# Patient Record
Sex: MALE | Race: WHITE | Employment: OTHER | URBAN - METROPOLITAN AREA
[De-identification: names, ages, dates, MRNs, and addresses within clinical notes are randomized per-mention and may not be internally consistent; named-entity substitution may affect disease eponyms.]

---

## 2018-02-02 ENCOUNTER — ANESTHESIA EVENT (OUTPATIENT)
Dept: OPERATING ROOM | Age: 68
DRG: 394 | End: 2018-02-02
Payer: MEDICARE

## 2018-02-02 ENCOUNTER — ANESTHESIA (OUTPATIENT)
Dept: OPERATING ROOM | Age: 68
DRG: 394 | End: 2018-02-02
Payer: MEDICARE

## 2018-02-02 ENCOUNTER — APPOINTMENT (OUTPATIENT)
Dept: NUCLEAR MEDICINE | Age: 68
DRG: 394 | End: 2018-02-02
Payer: MEDICARE

## 2018-02-02 ENCOUNTER — HOSPITAL ENCOUNTER (INPATIENT)
Age: 68
LOS: 1 days | Discharge: HOME | DRG: 394 | End: 2018-02-03
Admitting: INTERNAL MEDICINE
Payer: MEDICARE

## 2018-02-02 VITALS — DIASTOLIC BLOOD PRESSURE: 52 MMHG | SYSTOLIC BLOOD PRESSURE: 92 MMHG | OXYGEN SATURATION: 98 %

## 2018-02-02 DIAGNOSIS — R55 SYNCOPE AND COLLAPSE: ICD-10-CM

## 2018-02-02 DIAGNOSIS — K92.2 LOWER GI BLEED: Primary | ICD-10-CM

## 2018-02-02 PROBLEM — K62.5 GASTROINTESTINAL HEMORRHAGE ASSOCIATED WITH ANORECTAL SOURCE: Status: ACTIVE | Noted: 2018-02-02

## 2018-02-02 LAB
ABSOLUTE EOS #: 0.1 K/UL (ref 0–0.4)
ABSOLUTE IMMATURE GRANULOCYTE: ABNORMAL K/UL (ref 0–0.3)
ABSOLUTE LYMPH #: 1.4 K/UL (ref 1–4.8)
ABSOLUTE MONO #: 0.5 K/UL (ref 0.2–0.8)
ALBUMIN SERPL-MCNC: 4 G/DL (ref 3.5–5.2)
ALBUMIN/GLOBULIN RATIO: ABNORMAL (ref 1–2.5)
ALP BLD-CCNC: 54 U/L (ref 40–129)
ALT SERPL-CCNC: 21 U/L (ref 5–41)
ANION GAP SERPL CALCULATED.3IONS-SCNC: 11 MMOL/L (ref 9–17)
AST SERPL-CCNC: 18 U/L
BASOPHILS # BLD: 1 % (ref 0–2)
BASOPHILS ABSOLUTE: 0 K/UL (ref 0–0.2)
BILIRUB SERPL-MCNC: 0.22 MG/DL (ref 0.3–1.2)
BILIRUBIN DIRECT: <0.08 MG/DL
BILIRUBIN URINE: NEGATIVE
BILIRUBIN, INDIRECT: ABNORMAL MG/DL (ref 0–1)
BUN BLDV-MCNC: 20 MG/DL (ref 8–23)
BUN/CREAT BLD: 22 (ref 9–20)
CALCIUM SERPL-MCNC: 8.1 MG/DL (ref 8.6–10.4)
CHLORIDE BLD-SCNC: 104 MMOL/L (ref 98–107)
CK MB: 1.4 NG/ML
CO2: 20 MMOL/L (ref 20–31)
COLOR: YELLOW
COMMENT UA: ABNORMAL
CREAT SERPL-MCNC: 0.9 MG/DL (ref 0.7–1.2)
DIFFERENTIAL TYPE: ABNORMAL
EKG ATRIAL RATE: 63 BPM
EKG P AXIS: 35 DEGREES
EKG P-R INTERVAL: 218 MS
EKG Q-T INTERVAL: 438 MS
EKG QRS DURATION: 92 MS
EKG QTC CALCULATION (BAZETT): 448 MS
EKG R AXIS: -11 DEGREES
EKG T AXIS: 4 DEGREES
EKG VENTRICULAR RATE: 63 BPM
EOSINOPHILS RELATIVE PERCENT: 2 % (ref 1–4)
GFR AFRICAN AMERICAN: >60 ML/MIN
GFR NON-AFRICAN AMERICAN: >60 ML/MIN
GFR SERPL CREATININE-BSD FRML MDRD: ABNORMAL ML/MIN/{1.73_M2}
GFR SERPL CREATININE-BSD FRML MDRD: ABNORMAL ML/MIN/{1.73_M2}
GLOBULIN: ABNORMAL G/DL (ref 1.5–3.8)
GLUCOSE BLD-MCNC: 153 MG/DL (ref 70–99)
GLUCOSE URINE: NEGATIVE
HCT VFR BLD CALC: 19.7 % (ref 41–53)
HCT VFR BLD CALC: 20.7 % (ref 41–53)
HCT VFR BLD CALC: 23 % (ref 41–53)
HEMOGLOBIN: 6.3 G/DL (ref 13.5–17.5)
HEMOGLOBIN: 6.6 G/DL (ref 13.5–17.5)
HEMOGLOBIN: 7.3 G/DL (ref 13.5–17.5)
HEMOGLOBIN: 8 G/DL (ref 13.5–17.5)
IMMATURE GRANULOCYTES: ABNORMAL %
INR BLD: 1
KETONES, URINE: ABNORMAL
LACTIC ACID: 1.1 MMOL/L (ref 0.5–2.2)
LACTIC ACID: 2 MMOL/L (ref 0.5–2.2)
LEUKOCYTE ESTERASE, URINE: NEGATIVE
LIPASE: 38 U/L (ref 13–60)
LYMPHOCYTES # BLD: 20 % (ref 24–44)
MAGNESIUM: 2.1 MG/DL (ref 1.6–2.6)
MCH RBC QN AUTO: 25 PG (ref 26–34)
MCHC RBC AUTO-ENTMCNC: 31.5 G/DL (ref 31–37)
MCV RBC AUTO: 79.5 FL (ref 80–100)
MONOCYTES # BLD: 8 % (ref 1–7)
MYOGLOBIN: 24 NG/ML (ref 28–72)
MYOGLOBIN: 24 NG/ML (ref 28–72)
MYOGLOBIN: 49 NG/ML (ref 28–72)
NITRITE, URINE: NEGATIVE
NRBC AUTOMATED: ABNORMAL PER 100 WBC
PARTIAL THROMBOPLASTIN TIME: 19.4 SEC (ref 23–31)
PDW BLD-RTO: 19.1 % (ref 11.5–14.5)
PH UA: 5.5 (ref 5–8)
PLATELET # BLD: 287 K/UL (ref 130–400)
PLATELET ESTIMATE: ABNORMAL
PMV BLD AUTO: 9.1 FL (ref 6–12)
POTASSIUM SERPL-SCNC: 4.2 MMOL/L (ref 3.7–5.3)
PROTEIN UA: NEGATIVE
PROTHROMBIN TIME: 10.7 SEC (ref 9.7–11.6)
RBC # BLD: 2.9 M/UL (ref 4.5–5.9)
RBC # BLD: ABNORMAL 10*6/UL
SEG NEUTROPHILS: 69 % (ref 36–66)
SEGMENTED NEUTROPHILS ABSOLUTE COUNT: 4.9 K/UL (ref 1.8–7.7)
SODIUM BLD-SCNC: 135 MMOL/L (ref 135–144)
SPECIFIC GRAVITY UA: 1.02 (ref 1–1.03)
TOTAL CK: 59 U/L (ref 39–308)
TOTAL PROTEIN: 6.3 G/DL (ref 6.4–8.3)
TROPONIN INTERP: ABNORMAL
TROPONIN INTERP: ABNORMAL
TROPONIN INTERP: NORMAL
TROPONIN T: <0.03 NG/ML
TURBIDITY: CLEAR
URINE HGB: NEGATIVE
UROBILINOGEN, URINE: NORMAL
WBC # BLD: 7 K/UL (ref 3.5–11)
WBC # BLD: ABNORMAL 10*3/UL

## 2018-02-02 PROCEDURE — 3700000000 HC ANESTHESIA ATTENDED CARE: Performed by: INTERNAL MEDICINE

## 2018-02-02 PROCEDURE — 84484 ASSAY OF TROPONIN QUANT: CPT

## 2018-02-02 PROCEDURE — 3609027000 HC COLONOSCOPY: Performed by: INTERNAL MEDICINE

## 2018-02-02 PROCEDURE — 86850 RBC ANTIBODY SCREEN: CPT

## 2018-02-02 PROCEDURE — 2580000003 HC RX 258: Performed by: ANESTHESIOLOGY

## 2018-02-02 PROCEDURE — 36430 TRANSFUSION BLD/BLD COMPNT: CPT | Performed by: NURSE PRACTITIONER

## 2018-02-02 PROCEDURE — 87205 SMEAR GRAM STAIN: CPT

## 2018-02-02 PROCEDURE — 3430000000 HC RX DIAGNOSTIC RADIOPHARMACEUTICAL: Performed by: INTERNAL MEDICINE

## 2018-02-02 PROCEDURE — 2500000003 HC RX 250 WO HCPCS: Performed by: ANESTHESIOLOGY

## 2018-02-02 PROCEDURE — 99285 EMERGENCY DEPT VISIT HI MDM: CPT

## 2018-02-02 PROCEDURE — 85014 HEMATOCRIT: CPT

## 2018-02-02 PROCEDURE — 83735 ASSAY OF MAGNESIUM: CPT

## 2018-02-02 PROCEDURE — 36415 COLL VENOUS BLD VENIPUNCTURE: CPT

## 2018-02-02 PROCEDURE — 93005 ELECTROCARDIOGRAM TRACING: CPT

## 2018-02-02 PROCEDURE — 82553 CREATINE MB FRACTION: CPT

## 2018-02-02 PROCEDURE — 0W3P8ZZ CONTROL BLEEDING IN GASTROINTESTINAL TRACT, VIA NATURAL OR ARTIFICIAL OPENING ENDOSCOPIC: ICD-10-PCS | Performed by: INTERNAL MEDICINE

## 2018-02-02 PROCEDURE — 45382 COLONOSCOPY W/CONTROL BLEED: CPT | Performed by: INTERNAL MEDICINE

## 2018-02-02 PROCEDURE — P9016 RBC LEUKOCYTES REDUCED: HCPCS

## 2018-02-02 PROCEDURE — 78278 ACUTE GI BLOOD LOSS IMAGING: CPT

## 2018-02-02 PROCEDURE — A9560 TC99M LABELED RBC: HCPCS | Performed by: INTERNAL MEDICINE

## 2018-02-02 PROCEDURE — 83605 ASSAY OF LACTIC ACID: CPT

## 2018-02-02 PROCEDURE — 2720000010 HC SURG SUPPLY STERILE: Performed by: INTERNAL MEDICINE

## 2018-02-02 PROCEDURE — 83874 ASSAY OF MYOGLOBIN: CPT

## 2018-02-02 PROCEDURE — 2780000010 HC IMPLANT OTHER: Performed by: INTERNAL MEDICINE

## 2018-02-02 PROCEDURE — 6360000002 HC RX W HCPCS: Performed by: ANESTHESIOLOGY

## 2018-02-02 PROCEDURE — 86920 COMPATIBILITY TEST SPIN: CPT

## 2018-02-02 PROCEDURE — 6360000002 HC RX W HCPCS: Performed by: INTERNAL MEDICINE

## 2018-02-02 PROCEDURE — 85018 HEMOGLOBIN: CPT

## 2018-02-02 PROCEDURE — 85730 THROMBOPLASTIN TIME PARTIAL: CPT

## 2018-02-02 PROCEDURE — 87505 NFCT AGENT DETECTION GI: CPT

## 2018-02-02 PROCEDURE — 85025 COMPLETE CBC W/AUTO DIFF WBC: CPT

## 2018-02-02 PROCEDURE — 80076 HEPATIC FUNCTION PANEL: CPT

## 2018-02-02 PROCEDURE — 82272 OCCULT BLD FECES 1-3 TESTS: CPT

## 2018-02-02 PROCEDURE — 80048 BASIC METABOLIC PNL TOTAL CA: CPT

## 2018-02-02 PROCEDURE — 3700000001 HC ADD 15 MINUTES (ANESTHESIA): Performed by: INTERNAL MEDICINE

## 2018-02-02 PROCEDURE — 83690 ASSAY OF LIPASE: CPT

## 2018-02-02 PROCEDURE — 81003 URINALYSIS AUTO W/O SCOPE: CPT

## 2018-02-02 PROCEDURE — 2580000003 HC RX 258: Performed by: INTERNAL MEDICINE

## 2018-02-02 PROCEDURE — 2000000000 HC ICU R&B

## 2018-02-02 PROCEDURE — 82550 ASSAY OF CK (CPK): CPT

## 2018-02-02 PROCEDURE — 36430 TRANSFUSION BLD/BLD COMPNT: CPT

## 2018-02-02 PROCEDURE — 86900 BLOOD TYPING SEROLOGIC ABO: CPT

## 2018-02-02 PROCEDURE — C9113 INJ PANTOPRAZOLE SODIUM, VIA: HCPCS | Performed by: INTERNAL MEDICINE

## 2018-02-02 PROCEDURE — 7100000001 HC PACU RECOVERY - ADDTL 15 MIN: Performed by: INTERNAL MEDICINE

## 2018-02-02 PROCEDURE — 7100000000 HC PACU RECOVERY - FIRST 15 MIN: Performed by: INTERNAL MEDICINE

## 2018-02-02 PROCEDURE — 99223 1ST HOSP IP/OBS HIGH 75: CPT | Performed by: INTERNAL MEDICINE

## 2018-02-02 PROCEDURE — 2580000003 HC RX 258

## 2018-02-02 PROCEDURE — 86901 BLOOD TYPING SEROLOGIC RH(D): CPT

## 2018-02-02 PROCEDURE — 85610 PROTHROMBIN TIME: CPT

## 2018-02-02 DEVICE — WORKING LENGTH 235CM, WORKING CHANNEL 2.8MM
Type: IMPLANTABLE DEVICE | Status: FUNCTIONAL
Brand: RESOLUTION 360 CLIP

## 2018-02-02 RX ORDER — LIDOCAINE HYDROCHLORIDE 20 MG/ML
INJECTION, SOLUTION INFILTRATION; PERINEURAL PRN
Status: DISCONTINUED | OUTPATIENT
Start: 2018-02-02 | End: 2018-02-02 | Stop reason: SDUPTHER

## 2018-02-02 RX ORDER — 0.9 % SODIUM CHLORIDE 0.9 %
1000 INTRAVENOUS SOLUTION INTRAVENOUS ONCE
Status: COMPLETED | OUTPATIENT
Start: 2018-02-02 | End: 2018-02-02

## 2018-02-02 RX ORDER — EPINEPHRINE 1 MG/ML
INJECTION, SOLUTION, CONCENTRATE INTRAVENOUS PRN
Status: DISCONTINUED | OUTPATIENT
Start: 2018-02-02 | End: 2018-02-02 | Stop reason: HOSPADM

## 2018-02-02 RX ORDER — SODIUM CHLORIDE 9 MG/ML
INJECTION, SOLUTION INTRAVENOUS CONTINUOUS PRN
Status: DISCONTINUED | OUTPATIENT
Start: 2018-02-02 | End: 2018-02-02 | Stop reason: SDUPTHER

## 2018-02-02 RX ORDER — ACETAMINOPHEN 325 MG/1
650 TABLET ORAL EVERY 4 HOURS PRN
Status: DISCONTINUED | OUTPATIENT
Start: 2018-02-02 | End: 2018-02-03 | Stop reason: HOSPADM

## 2018-02-02 RX ORDER — PROPOFOL 10 MG/ML
INJECTION, EMULSION INTRAVENOUS PRN
Status: DISCONTINUED | OUTPATIENT
Start: 2018-02-02 | End: 2018-02-02 | Stop reason: SDUPTHER

## 2018-02-02 RX ORDER — SODIUM CHLORIDE 0.9 % (FLUSH) 0.9 %
10 SYRINGE (ML) INJECTION PRN
Status: DISCONTINUED | OUTPATIENT
Start: 2018-02-02 | End: 2018-02-03 | Stop reason: HOSPADM

## 2018-02-02 RX ORDER — ONDANSETRON 2 MG/ML
4 INJECTION INTRAMUSCULAR; INTRAVENOUS
Status: DISCONTINUED | OUTPATIENT
Start: 2018-02-02 | End: 2018-02-02 | Stop reason: HOSPADM

## 2018-02-02 RX ORDER — 0.9 % SODIUM CHLORIDE 0.9 %
500 INTRAVENOUS SOLUTION INTRAVENOUS ONCE
Status: COMPLETED | OUTPATIENT
Start: 2018-02-02 | End: 2018-02-02

## 2018-02-02 RX ORDER — ONDANSETRON 2 MG/ML
4 INJECTION INTRAMUSCULAR; INTRAVENOUS EVERY 6 HOURS PRN
Status: DISCONTINUED | OUTPATIENT
Start: 2018-02-02 | End: 2018-02-03 | Stop reason: HOSPADM

## 2018-02-02 RX ORDER — SODIUM CHLORIDE 0.9 % (FLUSH) 0.9 %
10 SYRINGE (ML) INJECTION EVERY 12 HOURS SCHEDULED
Status: DISCONTINUED | OUTPATIENT
Start: 2018-02-02 | End: 2018-02-03 | Stop reason: HOSPADM

## 2018-02-02 RX ADMIN — SODIUM CHLORIDE 500 ML: 9 INJECTION, SOLUTION INTRAVENOUS at 15:15

## 2018-02-02 RX ADMIN — SODIUM CHLORIDE 8 MG/HR: 9 INJECTION, SOLUTION INTRAVENOUS at 11:03

## 2018-02-02 RX ADMIN — SODIUM CHLORIDE 80 MG: 9 INJECTION, SOLUTION INTRAVENOUS at 10:45

## 2018-02-02 RX ADMIN — PROPOFOL 100 MG: 10 INJECTION, EMULSION INTRAVENOUS at 16:48

## 2018-02-02 RX ADMIN — SODIUM CHLORIDE: 9 INJECTION, SOLUTION INTRAVENOUS at 16:43

## 2018-02-02 RX ADMIN — SODIUM CHLORIDE 8 MG/HR: 9 INJECTION, SOLUTION INTRAVENOUS at 22:00

## 2018-02-02 RX ADMIN — PROPOFOL 50 MG: 10 INJECTION, EMULSION INTRAVENOUS at 17:01

## 2018-02-02 RX ADMIN — Medication 26 MILLICURIE: at 07:50

## 2018-02-02 RX ADMIN — SODIUM CHLORIDE 1000 ML: 9 INJECTION, SOLUTION INTRAVENOUS at 05:30

## 2018-02-02 RX ADMIN — SODIUM CHLORIDE: 9 INJECTION, SOLUTION INTRAVENOUS at 07:38

## 2018-02-02 RX ADMIN — PROPOFOL 50 MG: 10 INJECTION, EMULSION INTRAVENOUS at 16:50

## 2018-02-02 RX ADMIN — PROPOFOL 20 MG: 10 INJECTION, EMULSION INTRAVENOUS at 17:03

## 2018-02-02 RX ADMIN — Medication 0.2 MG: at 17:08

## 2018-02-02 RX ADMIN — LIDOCAINE HYDROCHLORIDE 100 MG: 20 INJECTION, SOLUTION INFILTRATION; PERINEURAL at 16:48

## 2018-02-02 ASSESSMENT — ENCOUNTER SYMPTOMS
COUGH: 0
NAUSEA: 1
VOMITING: 0
SHORTNESS OF BREATH: 0
PHOTOPHOBIA: 1
SHORTNESS OF BREATH: 0
ABDOMINAL DISTENTION: 1
BLOOD IN STOOL: 1
DIARRHEA: 1
CHEST TIGHTNESS: 1

## 2018-02-02 ASSESSMENT — PAIN DESCRIPTION - LOCATION
LOCATION: RECTUM
LOCATION: ABDOMEN;RECTUM
LOCATION: RECTUM

## 2018-02-02 ASSESSMENT — PULMONARY FUNCTION TESTS
PIF_VALUE: 1

## 2018-02-02 ASSESSMENT — PAIN SCALES - GENERAL: PAINLEVEL_OUTOF10: 0

## 2018-02-02 ASSESSMENT — PAIN DESCRIPTION - PAIN TYPE
TYPE: SURGICAL PAIN

## 2018-02-02 NOTE — ED PROVIDER NOTES
cervical adenopathy. Neurological: He is alert and oriented to person, place, and time. He displays normal reflexes. No cranial nerve deficit. Coordination normal.   Skin: Skin is warm and dry. Psychiatric: He has a normal mood and affect. His behavior is normal.   Nursing note and vitals reviewed. DIAGNOSTIC RESULTS     EKG: All EKG's are interpreted by the Emergency Department Physician who either signs or Co-signs this chart in the absence of a cardiologist.    Normal sinus rhythm with first-degree AV block    RADIOLOGY:   Non-plain film images such as CT, Ultrasound and MRI are read by the radiologist. Plain radiographic images are visualized and preliminarily interpreted by the emergency physician with the below findings:      No results found. Interpretation per the Radiologist below, if available at the time of this note:    No orders to display         ED BEDSIDE ULTRASOUND:   Performed by ED Physician - none    LABS:  Labs Reviewed   HEPATIC FUNCTION PANEL - Abnormal; Notable for the following:        Result Value    Total Bilirubin 0.22 (*)     Total Protein 6.3 (*)     All other components within normal limits   CBC WITH AUTO DIFFERENTIAL - Abnormal; Notable for the following:     RBC 2.90 (*)     Hemoglobin 7.3 (*)     Hematocrit 23.0 (*)     MCV 79.5 (*)     MCH 25.0 (*)     RDW 19.1 (*)     Seg Neutrophils 69 (*)     Lymphocytes 20 (*)     Monocytes 8 (*)     All other components within normal limits   C DIFF TOXIN B BY RT PCR   CULTURE STOOL   FECAL LEUKOCYTES   LIPASE   MAGNESIUM   TROP/MYOGLOBIN   CK-MB   CK   APTT   LACTIC ACID   PROTIME-INR   URINALYSIS   BLOOD OCCULT STOOL DIAGNOSTIC   TYPE AND SCREEN   TYPE AND SCREEN   PREPARE RBC (CROSSMATCH)       All other labs were within normal range or not returned as of this dictation.     EMERGENCY DEPARTMENT COURSE and DIFFERENTIAL DIAGNOSIS/MDM:   Vitals:    Vitals:    02/02/18 0501 02/02/18 0516 02/02/18 0531 02/02/18 0546   BP: 113/80 122/77 107/75 113/81   Pulse: 60 64 62 62   Resp: 8 10 12 12   Temp:       SpO2:   100% 99%   Weight:       Height:         Patient is typed and crossed for 2 units of blood will be admitted to the intensive care unit with consult to GI. The bleeding is felt to be lower GI tract. There is no history of antibiotic use or colitis. Critical care time on this patient's 40 minutes    CONSULTS:  IP CONSULT TO INTERNAL MEDICINE  IP CONSULT TO GI    PROCEDURES:  Not clinically indicated. FINAL IMPRESSION      1. Lower GI bleed    2. Syncope and collapse          DISPOSITION/PLAN   DISPOSITION Decision To Admit 02/02/2018 05:48:32 AM      PATIENT REFERRED TO:   No follow-up provider specified.     DISCHARGE MEDICATIONS:     New Prescriptions    No medications on file           (Please note that portions of this note were completed with a voice recognition program.  Efforts were made to edit the dictations but occasionally words are mis-transcribed.)    Adolph Hdz MD  Attending Emergency Physician           Adolph Hdz MD  02/02/18 [id number]

## 2018-02-02 NOTE — OP NOTE
carefully inspected. The mucosal exam showed moderate diverticulosis in left colon with no active bleeding. An ulcerated internal hemorrhoid with active bleeding was noted. Retroflexion was performed in the rectum and internal hemorrhoids. Epinephrine was injected into the bleeding site with some control. Further we placed a hemoclip at the base of the bleeding site. Subsequently a band was placed on the bleeding hemorrhoid with adequate control. RECOMMENDATIONS:   1) Transfer back to the floor for further management as per primary care team.    2) Monitor H&H closely. 3) Transfuse as needed to keep 7 < hgb < 9.         Avelina Watkins

## 2018-02-02 NOTE — ED NOTES
Pt presents to the er c/o rectal bleeding. Pt states that he is under the care of Dr. Lizzette Hodge for hemorroids and he had been rectally bleeding until one week ago. Pt states that he got up had a large amount of blood became dizzy fell and hit his head and had a loc the patient has a large area of redness of top of his head and a small area of redness on the end of his nose from the fall  .  Upon arrival pt is alert oriented x 4 pt is pale cool  and dry/ pt denies any c/o pain     Honor TERRENCE King  02/02/18 3136 [facility] Rosita Patel RN  02/02/18 [id number]

## 2018-02-03 VITALS
WEIGHT: 210 LBS | DIASTOLIC BLOOD PRESSURE: 75 MMHG | TEMPERATURE: 98.6 F | OXYGEN SATURATION: 97 % | RESPIRATION RATE: 18 BRPM | BODY MASS INDEX: 31.83 KG/M2 | SYSTOLIC BLOOD PRESSURE: 122 MMHG | HEIGHT: 68 IN | HEART RATE: 74 BPM

## 2018-02-03 PROBLEM — I95.9 HYPOTENSION: Status: ACTIVE | Noted: 2018-02-03

## 2018-02-03 PROBLEM — R55 VASOVAGAL SYNCOPE: Status: ACTIVE | Noted: 2018-02-03

## 2018-02-03 LAB
ABO/RH: NORMAL
ABSOLUTE EOS #: 0.1 K/UL (ref 0–0.4)
ABSOLUTE IMMATURE GRANULOCYTE: ABNORMAL K/UL (ref 0–0.3)
ABSOLUTE LYMPH #: 1.4 K/UL (ref 1–4.8)
ABSOLUTE MONO #: 0.4 K/UL (ref 0.2–0.8)
ALBUMIN SERPL-MCNC: 3.1 G/DL (ref 3.5–5.2)
ALBUMIN/GLOBULIN RATIO: ABNORMAL (ref 1–2.5)
ALP BLD-CCNC: 42 U/L (ref 40–129)
ALT SERPL-CCNC: 13 U/L (ref 5–41)
ANION GAP SERPL CALCULATED.3IONS-SCNC: 10 MMOL/L (ref 9–17)
ANTIBODY SCREEN: NEGATIVE
ARM BAND NUMBER: NORMAL
AST SERPL-CCNC: 11 U/L
BASOPHILS # BLD: 1 % (ref 0–2)
BASOPHILS ABSOLUTE: 0 K/UL (ref 0–0.2)
BILIRUB SERPL-MCNC: 0.76 MG/DL (ref 0.3–1.2)
BILIRUBIN DIRECT: 0.16 MG/DL
BILIRUBIN, INDIRECT: 0.6 MG/DL (ref 0–1)
BLD PROD TYP BPU: NORMAL
BUN BLDV-MCNC: 13 MG/DL (ref 8–23)
CALCIUM SERPL-MCNC: 7.3 MG/DL (ref 8.6–10.4)
CHLORIDE BLD-SCNC: 108 MMOL/L (ref 98–107)
CO2: 21 MMOL/L (ref 20–31)
CREAT SERPL-MCNC: 0.94 MG/DL (ref 0.7–1.2)
CROSSMATCH RESULT: NORMAL
DIFFERENTIAL TYPE: ABNORMAL
DIRECT EXAM: NORMAL
DIRECT EXAM: NORMAL
DISPENSE STATUS BLOOD BANK: NORMAL
EOSINOPHILS RELATIVE PERCENT: 1 % (ref 1–4)
EXPIRATION DATE: NORMAL
GFR AFRICAN AMERICAN: >60 ML/MIN
GFR NON-AFRICAN AMERICAN: >60 ML/MIN
GFR SERPL CREATININE-BSD FRML MDRD: ABNORMAL ML/MIN/{1.73_M2}
GFR SERPL CREATININE-BSD FRML MDRD: ABNORMAL ML/MIN/{1.73_M2}
GLUCOSE BLD-MCNC: 109 MG/DL (ref 70–99)
HCT VFR BLD CALC: 24 % (ref 41–53)
HCT VFR BLD CALC: 24.4 % (ref 41–53)
HCT VFR BLD CALC: 24.8 % (ref 41–53)
HCT VFR BLD CALC: 25 % (ref 41–53)
HEMOGLOBIN: 7.8 G/DL (ref 13.5–17.5)
HEMOGLOBIN: 8 G/DL (ref 13.5–17.5)
HEMOGLOBIN: 8.1 G/DL (ref 13.5–17.5)
HEMOGLOBIN: 8.1 G/DL (ref 13.5–17.5)
IMMATURE GRANULOCYTES: ABNORMAL %
LV EF: 55 %
LVEF MODALITY: NORMAL
LYMPHOCYTES # BLD: 25 % (ref 24–44)
Lab: NORMAL
MCH RBC QN AUTO: 27.4 PG (ref 26–34)
MCHC RBC AUTO-ENTMCNC: 32.6 G/DL (ref 31–37)
MCV RBC AUTO: 84.1 FL (ref 80–100)
MONOCYTES # BLD: 7 % (ref 1–7)
MYOGLOBIN: 29 NG/ML (ref 28–72)
NRBC AUTOMATED: ABNORMAL PER 100 WBC
PDW BLD-RTO: 18.3 % (ref 11.5–14.5)
PLATELET # BLD: 195 K/UL (ref 130–400)
PLATELET ESTIMATE: ABNORMAL
PMV BLD AUTO: 9.1 FL (ref 6–12)
POTASSIUM SERPL-SCNC: 3.8 MMOL/L (ref 3.7–5.3)
RBC # BLD: 2.85 M/UL (ref 4.5–5.9)
RBC # BLD: ABNORMAL 10*6/UL
SEG NEUTROPHILS: 66 % (ref 36–66)
SEGMENTED NEUTROPHILS ABSOLUTE COUNT: 4 K/UL (ref 1.8–7.7)
SODIUM BLD-SCNC: 139 MMOL/L (ref 135–144)
SPECIMEN DESCRIPTION: NORMAL
SPECIMEN DESCRIPTION: NORMAL
STATUS: NORMAL
TOTAL PROTEIN: 4.7 G/DL (ref 6.4–8.3)
TRANSFUSION STATUS: NORMAL
TROPONIN INTERP: NORMAL
TROPONIN T: <0.03 NG/ML
UNIT DIVISION: 0
UNIT NUMBER: NORMAL
WBC # BLD: 5.9 K/UL (ref 3.5–11)
WBC # BLD: ABNORMAL 10*3/UL

## 2018-02-03 PROCEDURE — 85025 COMPLETE CBC W/AUTO DIFF WBC: CPT

## 2018-02-03 PROCEDURE — 36415 COLL VENOUS BLD VENIPUNCTURE: CPT

## 2018-02-03 PROCEDURE — 83874 ASSAY OF MYOGLOBIN: CPT

## 2018-02-03 PROCEDURE — 85018 HEMOGLOBIN: CPT

## 2018-02-03 PROCEDURE — 82248 BILIRUBIN DIRECT: CPT

## 2018-02-03 PROCEDURE — 85014 HEMATOCRIT: CPT

## 2018-02-03 PROCEDURE — 6360000002 HC RX W HCPCS: Performed by: INTERNAL MEDICINE

## 2018-02-03 PROCEDURE — 80053 COMPREHEN METABOLIC PANEL: CPT

## 2018-02-03 PROCEDURE — 93306 TTE W/DOPPLER COMPLETE: CPT

## 2018-02-03 PROCEDURE — 84484 ASSAY OF TROPONIN QUANT: CPT

## 2018-02-03 PROCEDURE — 99232 SBSQ HOSP IP/OBS MODERATE 35: CPT | Performed by: INTERNAL MEDICINE

## 2018-02-03 PROCEDURE — 2580000003 HC RX 258: Performed by: INTERNAL MEDICINE

## 2018-02-03 PROCEDURE — C9113 INJ PANTOPRAZOLE SODIUM, VIA: HCPCS | Performed by: INTERNAL MEDICINE

## 2018-02-03 RX ORDER — SODIUM CHLORIDE 9 MG/ML
INJECTION, SOLUTION INTRAVENOUS CONTINUOUS
Status: CANCELLED | OUTPATIENT
Start: 2018-02-03

## 2018-02-03 RX ORDER — ASPIRIN 81 MG/1
81 TABLET ORAL DAILY
Status: ON HOLD | COMMUNITY
End: 2018-02-03 | Stop reason: HOSPADM

## 2018-02-03 RX ORDER — CHLORAL HYDRATE 500 MG
1000 CAPSULE ORAL DAILY
Status: ON HOLD | COMMUNITY
End: 2018-02-03 | Stop reason: HOSPADM

## 2018-02-03 RX ORDER — FERROUS SULFATE 325(65) MG
325 TABLET ORAL
Qty: 30 TABLET | Refills: 0 | Status: SHIPPED | OUTPATIENT
Start: 2018-02-03 | End: 2018-02-03 | Stop reason: HOSPADM

## 2018-02-03 RX ORDER — FERROUS SULFATE 325(65) MG
325 TABLET ORAL
Qty: 30 TABLET | Refills: 3 | Status: SHIPPED | OUTPATIENT
Start: 2018-02-03 | End: 2022-07-07

## 2018-02-03 RX ORDER — SODIUM CHLORIDE 9 MG/ML
INJECTION, SOLUTION INTRAVENOUS CONTINUOUS
Status: DISCONTINUED | OUTPATIENT
Start: 2018-02-03 | End: 2018-02-03

## 2018-02-03 RX ADMIN — SODIUM CHLORIDE: 9 INJECTION, SOLUTION INTRAVENOUS at 12:05

## 2018-02-03 RX ADMIN — SODIUM CHLORIDE 8 MG/HR: 9 INJECTION, SOLUTION INTRAVENOUS at 09:04

## 2018-02-03 ASSESSMENT — PAIN SCALES - GENERAL: PAINLEVEL_OUTOF10: 0

## 2018-02-03 NOTE — PROGRESS NOTES
Follow up rectal bleed  No more rectal bleeding no abdominal pain no nausea no vomiting no fatigue  Review of system  No fever no chills no GI/ complaints no cardiac primary complaints no TIA no bleeding no polyuria no polydipsia no hypoglycemia, no headache no sinus pain no sore throat no skin lesions  Vitals stable  Eyes mild pallor no jaundice  Neck supple no JVD  Lungs air entry equal clear to auscultation percussion  Heart regular rate and rhythm without gallop  Abdomen soft plus bowel sounds without any tenderness  Extremities good pulses without edema negative Homans sign  Neuro alert oriented ×3 with no focal deficit  Assessment and plan  Rectal bleeding secondary to ulcerated bleeding internal hemorrhoids  Diverticulosis  Acute blood loss anemia  Vasovagal syncope  Hypovolemic hypotension with vasovagal  Meds labs reviewed recheck H&H if okay with GI will discharge later today see orders
Pt was admitted to room 1101, connected to unit blood pressure, pulse ox and monitor. Immediately taken to nuclear med. For bleeding scan,  Accompanied by writer. Within 20 minutes pt became diaphoretic, very pale, He was briefly non response, eyes rolling back. B/p reading at that time low 59'Q systolic,  Pt. Had large bright red liquid stool with what appeared to be large clots per bedpan. Fluid bolus given and b/p returned to base line within fifteen minutes with resolution of sweating , dizziness. Pt was able to complete the testing and returned to icu.
oriented to person, place and time and normal affect    Abdomen: soft, nontender, nondistended, bowel sounds present     Assessment:        Primary Problem  <principal problem not specified>     Active Hospital Problems    Diagnosis Date Noted    Vasovagal syncope [R55] 02/03/2018    Hypotension [I95.9] 02/03/2018    Gastrointestinal hemorrhage associated with anorectal source [K62.5] 02/02/2018    Hypotension [I95.9]     Acute blood loss anemia [D62]      History reviewed. No pertinent past medical history. Plan:        1. LOCAL CARE WAS EXPLAINED  2. WAS ASKED TO CALL IF HAS ANY ISSUES  3.  SOFT HIGH FIBER DIET  4. RTC IN 3-4 WEEKS IF NEEDED  5. HE IS FOLLOWED BY DR Jean Claude Delaney     Explained to the patient and d/W Nursing Staff    Please call or Page for any issues or change in status  Thanks    Electronically signed by Bernie Tejada MD on 2/3/2018 at 3:16 PM

## 2018-02-03 NOTE — CONSULTS
ulcers  Musculoskeletal: Denies muscle, joint, back pain. Allergies: Review of patient's allergies indicates no known allergies. Current Meds:  Current Facility-Administered Medications:     sodium chloride flush 0.9 % injection 10 mL, 10 mL, Intravenous, 2 times per day, Andrea Junior MD    sodium chloride flush 0.9 % injection 10 mL, 10 mL, Intravenous, PRN, Andrea Junior MD    acetaminophen (TYLENOL) tablet 650 mg, 650 mg, Oral, Q4H PRN, Andrea Junior MD    ondansetron (ZOFRAN) injection 4 mg, 4 mg, Intravenous, Q6H PRN, Andrea Junior MD    sodium chloride 0.9 % 1,000 mL infusion, , Intravenous, Continuous, Andrea Junior MD, Last Rate: 150 mL/hr at 02/02/18 0738    pantoprazole (PROTONIX) 80 mg in sodium chloride 0.9 % 100 mL infusion, 8 mg/hr, Intravenous, Continuous, Andrea Junior MD, Last Rate: 10 mL/hr at 02/02/18 1736, 8 mg/hr at 02/02/18 1736    Vital Signs:  Vitals:    02/03/18 0400   BP: 113/65   Pulse: 72   Resp: 19   Temp: 99 °F (37.2 °C)   SpO2: 96%       Physical Exam:  Vital signs and Nurse's note reviewed  Gen:  A&Ox3, NAD  HEENT: NCAT, PERRLA, EOMI, no scleral icterus, oral mucosa moist  Neck: Trachea midline without obvious masses or lesions  Chest: Symmetric rise with inhalation, no evidence of trauma  CVS: S1S2  Resp: Good bilateral air entry, no audible wheeze or rhonchi  Abd: soft, non-tender, non-distended, no hepatosplenomegaly or palpable masses  Ext: No clubbing, cyanosis, edema, peripheral pulses 2+ Rad/Fem/DP/PT  CNS: Moves all extremities, no gross focal motor deficits  Skin: No erythema or ulcerations     Labs:   CBC:   Recent Labs      02/02/18   0508   02/02/18   1311  02/02/18   1507  02/03/18   0203   WBC  7.0   --    --    --    --    HGB  7.3*   < >  8.0*  6.3*  8.0*   PLT  287   --    --    --    --     < > = values in this interval not displayed.      BMP:    Recent Labs      02/02/18   0832   NA  135   K  4.2   CL  104   CO2  20   BUN  20
Nm Gi Blood Loss    Result Date: 2/2/2018  EXAMINATION: NUCLEAR MEDICINE GASTRIC BLEEDING STUDY 2/2/2018 TECHNIQUE: Following the intravenous injection of 26.0 mCi of 99 mTc-labeled RBC's, a flow study and standard images of the abdomen was obtained over a total period of 60 minutes. Examination was paused for 30 minutes at the 20 minutes rachael due the patient's condition. Patient had dark diarrhea during the positive and imaging. COMPARISON: None. HISTORY: ORDERING SYSTEM PROVIDED HISTORY: GI bleed TECHNOLOGIST PROVIDED HISTORY: Ordering Physician Provided Reason for Exam: GI bleed Acuity: Unknown Type of Exam: Unknown Additional signs and symptoms: Pt had a large bowel movement of dark red blood during exam. His blood pressure dropped very low. Hemoglobin had continued to drop during exam as well FINDINGS: Activity is seen within the blood pool, including the great vessels, heart, liver, and spleen. A small amount of activity acumulates in the bladder over the course of the study. There was no abnormal accumulation of radioactivity in the abdomen to suggest active bleeding during study acquisition. No evidence of active GI bleeding during acquisition. RECOMMENDATIONS: If the patient shows hemodynamic signs of an active bleed in the next 20 hours, additional images can be acquired. IMPRESSION: Mr. Mal Joyner is a 69 y.o. male with rectal bleeding. Acute anemia. Hypertension. Plan for emergent colonoscopy. Awaiting final results of bleeding scan. Monitor H&H closely. Transfuse as needed to keep hemoglobin between 7 and 9. Thank you for allowing me to participate in the care of Mr. Mal Joyner. For any further questions please do not hesitate to contact me.        Sven Bernardo MD
edema   Pulses: 2+ and symmetric   Skin: Skin color, texture, turgor normal, no rashes or lesions   Pysch: Normal mood and affect   Neurologic: Normal gross motor and sensory exam.         Labs  CBC:   Lab Results   Component Value Date    WBC 5.9 02/03/2018    RBC 2.85 02/03/2018    HGB 7.8 02/03/2018    HCT 24.0 02/03/2018    MCV 84.1 02/03/2018    RDW 18.3 02/03/2018     02/03/2018     CMP:    Lab Results   Component Value Date     02/03/2018    K 3.8 02/03/2018     02/03/2018    CO2 21 02/03/2018    BUN 13 02/03/2018    CREATININE 0.94 02/03/2018    GFRAA >60 02/03/2018    LABGLOM >60 02/03/2018    GLUCOSE 109 02/03/2018    PROT 4.7 02/03/2018    CALCIUM 7.3 02/03/2018    BILITOT 0.76 02/03/2018    ALKPHOS 42 02/03/2018    AST 11 02/03/2018    ALT 13 02/03/2018     PT/INR:  No results found for: PTINR  Lab Results   Component Value Date    CKTOTAL 59 02/02/2018    CKMB 1.4 02/02/2018    TROPONINT <0.03 02/03/2018    TROPONINT <0.03 02/02/2018    TROPONINT <0.03 02/02/2018       EKG:  I have reviewed EKG with the following interpretation:  Impression:  Sinus rhythm without acute changes    Assessment  1. Lower GI bleed  2. Anemia due to acute blood loss  3. Shortness of breath  4. Syncope       Plan:    I had the opportunity to review the clinical symptoms and presentation of Fayette Dyers. · Echo at bedside showed normal LV function preliminarily  · No evidence of MI, ACS, or CHF  · Follow Hgb  · As long as symptoms do not recur, would consider his presentation due to acute, severe anemia    Thank you for allowing to us to participate in the care or Fayette Dyers. Further evaluation will be based upon the patient's clinical course and testing results. All questions and concerns were addressed to the patient/family. Alternatives to my treatment were discussed. The note was completed using EMR.  Every effort was made to ensure accuracy; however, inadvertent computerized transcription errors

## 2018-02-03 NOTE — H&P
History and Physical/ admit note      CHIEF COMPLAINT:  Rectal bleed    History of Present Illness: 58-year-old white gentleman came in to the emergency room with the 3 episodes of rectal bleeding bright red no abdominal pain no nausea no vomiting during one bowel movement with the rectal bleeding patient passed out momentarily for a few seconds denies any chest pain no palpitation no cough no shortness of breath no diaphoresis        History reviewed. No pertinent past medical history. Past Surgical History:   Procedure Laterality Date    COLONOSCOPY  02/02/2018    Transanal Colonoscopy with control of bleeding, injection of epinephrine and placement of band x 1. Medications Prior to Admission:    No prescriptions prior to admission. Allergies:    Review of patient's allergies indicates no known allergies. Social History:    reports that he has never smoked. He has never used smokeless tobacco. He reports that he drinks alcohol. He reports that he does not use drugs. Family History:   family history is not on file.     REVIEW OF SYSTEMS:    Constitutional: negative, Fever no chills  Eyes: negative  Ears, nose, mouth, throat, and face: negative  Respiratory: negative, No cough no shortness of breath  Cardiovascular: negative, Chest pain no palpitation syncopal episode times few seconds  Gastrointestinal: negative, Rectal bleeding  Genitourinary:negative  Integument/breast: negative  Hematologic/lymphatic: negative  Musculoskeletal:negative  Neurological: negative  Behavioral/Psych: negative  Endocrine: negative  Allergic/Immunologic: negative  PHYSICAL EXAM:  General Appearance: alert and oriented to person, place and time and in no acute distress  Skin: warm and dry, no rash or erythema  Head: normocephalic and atraumatic  Eyes: pupils equal, round, and reactive to light, sclera anicteric and positive pallor  Neck: neck supple and non tender without mass   Pulmonary/Chest: clear to auscultation bilaterally- no wheezes, rales or rhonchi, normal air movement, no respiratory distress  Cardiovascular: normal rate, regular rhythm, normal S1 and S2, no gallops, intact distal pulses and no carotid bruits  Abdomen: soft, non-tender, non-distended, normal bowel sounds, no masses or organomegaly  Extremities: no edema and good pulses no Neil sign    Neurologic: Alert and oriented ×3 with no focal deficit    Vitals:  /86   Pulse 75   Temp 97.9 °F (36.6 °C)   Resp 19   Ht 5' 8\" (1.727 m)   Wt 210 lb (95.3 kg)   SpO2 98%   BMI 31.93 kg/m²     LABS:  CBC:   Lab Results   Component Value Date    WBC 7.0 02/02/2018    RBC 2.90 02/02/2018    HGB 6.3 02/02/2018    HCT 19.7 02/02/2018    MCV 79.5 02/02/2018    MCH 25.0 02/02/2018    MCHC 31.5 02/02/2018    RDW 19.1 02/02/2018     02/02/2018    MPV 9.1 02/02/2018     CMP:    Lab Results   Component Value Date     02/02/2018    K 4.2 02/02/2018     02/02/2018    CO2 20 02/02/2018    BUN 20 02/02/2018    CREATININE 0.90 02/02/2018    GFRAA >60 02/02/2018    LABGLOM >60 02/02/2018    GLUCOSE 153 02/02/2018    PROT 6.3 02/02/2018    LABALBU 4.0 02/02/2018    CALCIUM 8.1 02/02/2018    BILITOT 0.22 02/02/2018    ALKPHOS 54 02/02/2018    AST 18 02/02/2018    ALT 21 02/02/2018         ASSESSMENT:    Lower GI bleed  Acute blood loss anemia  Hypotension secondary to hypovolemia  hyperGlycemia  Syncopal episode likely vasovagal  Patient Active Problem List   Diagnosis    Gastrointestinal hemorrhage associated with anorectal source    Hypotension    Acute blood loss anemia       PLAN:    Admit to ICU IV fluids packed RBC transfusion IV PPI cardiology consult for  Syncope, I consultation and general surgery consultation DVT prophylaxis due to active bleeding we will use only APC cuffs serial H&H see orders              Mandy Dubin MD  7:17 PM  2/2/2018

## 2018-02-03 NOTE — PLAN OF CARE
Problem: Fluid Volume - Imbalance:  Goal: Absence of imbalanced fluid volume signs and symptoms  Absence of imbalanced fluid volume signs and symptoms  Outcome: Ongoing

## 2018-11-02 ENCOUNTER — HOSPITAL ENCOUNTER (OUTPATIENT)
Age: 68
Discharge: HOME | End: 2018-11-02

## 2018-11-02 LAB — RUBV IGG SER QL: 50.3 IU/ML

## 2018-11-02 PROCEDURE — 86481 TB AG RESPONSE T-CELL SUSP: CPT

## 2018-11-02 PROCEDURE — 86735 MUMPS ANTIBODY: CPT

## 2018-11-02 PROCEDURE — 86765 RUBEOLA ANTIBODY: CPT

## 2018-11-02 PROCEDURE — 86787 VARICELLA-ZOSTER ANTIBODY: CPT

## 2018-11-02 PROCEDURE — 86762 RUBELLA ANTIBODY: CPT

## 2018-11-05 LAB
MEASLES IMMUNE (IGG): 4.95
MUV IGG SER QL: 3.52
T-SPOT TB TEST: NORMAL
VZV IGG SER QL IA: 1.65

## 2022-07-07 ENCOUNTER — HOSPITAL ENCOUNTER (OUTPATIENT)
Dept: PREADMISSION TESTING | Age: 72
Discharge: HOME | End: 2022-07-11
Payer: MEDICARE

## 2022-07-07 VITALS
WEIGHT: 210.3 LBS | TEMPERATURE: 97.5 F | SYSTOLIC BLOOD PRESSURE: 137 MMHG | OXYGEN SATURATION: 97 % | BODY MASS INDEX: 31.15 KG/M2 | HEART RATE: 62 BPM | HEIGHT: 69 IN | RESPIRATION RATE: 16 BRPM | DIASTOLIC BLOOD PRESSURE: 89 MMHG

## 2022-07-07 LAB
ABO/RH: NORMAL
ANTIBODY SCREEN: NEGATIVE
ARM BAND NUMBER: NORMAL
BUN BLDV-MCNC: 18 MG/DL (ref 8–23)
CREAT SERPL-MCNC: 1.09 MG/DL (ref 0.7–1.2)
EKG ATRIAL RATE: 58 BPM
EKG P AXIS: 30 DEGREES
EKG P-R INTERVAL: 212 MS
EKG Q-T INTERVAL: 434 MS
EKG QRS DURATION: 92 MS
EKG QTC CALCULATION (BAZETT): 426 MS
EKG R AXIS: -49 DEGREES
EKG VENTRICULAR RATE: 58 BPM
EXPIRATION DATE: NORMAL
GFR AFRICAN AMERICAN: >60 ML/MIN
GFR NON-AFRICAN AMERICAN: >60 ML/MIN
GFR SERPL CREATININE-BSD FRML MDRD: NORMAL ML/MIN/{1.73_M2}
HCT VFR BLD CALC: 42 % (ref 40.7–50.3)
HEMOGLOBIN: 14.3 G/DL (ref 13–17)
MCH RBC QN AUTO: 32.4 PG (ref 25.2–33.5)
MCHC RBC AUTO-ENTMCNC: 34 G/DL (ref 28.4–34.8)
MCV RBC AUTO: 95 FL (ref 82.6–102.9)
NRBC AUTOMATED: 0 PER 100 WBC
PDW BLD-RTO: 12.4 % (ref 11.8–14.4)
PLATELET # BLD: 192 K/UL (ref 138–453)
PMV BLD AUTO: 10 FL (ref 8.1–13.5)
RBC # BLD: 4.42 M/UL (ref 4.21–5.77)
WBC # BLD: 5.2 K/UL (ref 3.5–11.3)

## 2022-07-07 PROCEDURE — 36415 COLL VENOUS BLD VENIPUNCTURE: CPT

## 2022-07-07 PROCEDURE — 84520 ASSAY OF UREA NITROGEN: CPT

## 2022-07-07 PROCEDURE — 86901 BLOOD TYPING SEROLOGIC RH(D): CPT

## 2022-07-07 PROCEDURE — 93005 ELECTROCARDIOGRAM TRACING: CPT | Performed by: ANESTHESIOLOGY

## 2022-07-07 PROCEDURE — 85027 COMPLETE CBC AUTOMATED: CPT

## 2022-07-07 PROCEDURE — 87641 MR-STAPH DNA AMP PROBE: CPT

## 2022-07-07 PROCEDURE — 82565 ASSAY OF CREATININE: CPT

## 2022-07-07 PROCEDURE — 86850 RBC ANTIBODY SCREEN: CPT

## 2022-07-07 PROCEDURE — 86900 BLOOD TYPING SEROLOGIC ABO: CPT

## 2022-07-07 RX ORDER — TELMISARTAN AND HYDROCHLORTHIAZIDE 40; 12.5 MG/1; MG/1
TABLET ORAL
COMMUNITY
Start: 2022-04-20

## 2022-07-07 RX ORDER — ACETAMINOPHEN 325 MG/1
650 TABLET ORAL EVERY 6 HOURS PRN
COMMUNITY

## 2022-07-07 RX ORDER — GABAPENTIN 300 MG/1
300 CAPSULE ORAL ONCE
Status: CANCELLED | OUTPATIENT
Start: 2022-07-27

## 2022-07-07 RX ORDER — ACETAMINOPHEN 500 MG
1000 TABLET ORAL ONCE
Status: CANCELLED | OUTPATIENT
Start: 2022-07-27

## 2022-07-07 RX ORDER — CELECOXIB 200 MG/1
200 CAPSULE ORAL ONCE
Status: CANCELLED | OUTPATIENT
Start: 2022-07-27

## 2022-07-07 ASSESSMENT — KOOS JR
STRAIGHTENING KNEE FULLY: 1
KOOS JR TOTAL INTERVAL SCORE: 59.381
BENDING TO THE FLOOR TO PICK UP OBJECT: 1
RISING FROM SITTING: 2
GOING UP OR DOWN STAIRS: 2
STANDING UPRIGHT: 1
HOW SEVERE IS YOUR KNEE STIFFNESS AFTER FIRST WAKING IN MORNING: 2
TWISING OR PIVOTING ON KNEE: 2

## 2022-07-07 ASSESSMENT — PROMIS GLOBAL HEALTH SCALE
SUM OF RESPONSES TO QUESTIONS 3, 6, 7, & 8: 14
IN THE PAST 7 DAYS, HOW WOULD YOU RATE YOUR FATIGUE ON AVERAGE [ON A SCALE FROM 1 (NONE) TO 5 (VERY SEVERE)]?: 2
WHO IS THE PERSON COMPLETING THE PROMIS V1.1 SURVEY?: 0
IN GENERAL, HOW WOULD YOU RATE YOUR PHYSICAL HEALTH [ON A SCALE OF 1 (POOR) TO 5 (EXCELLENT)]?: 4
IN GENERAL, PLEASE RATE HOW WELL YOU CARRY OUT YOUR USUAL SOCIAL ACTIVITIES (INCLUDES ACTIVITIES AT HOME, AT WORK, AND IN YOUR COMMUNITY, AND RESPONSIBILITIES AS A PARENT, CHILD, SPOUSE, EMPLOYEE, FRIEND, ETC) [ON A SCALE OF 1 (POOR) TO 5 (EXCELLENT)]?: 5
IN THE PAST 7 DAYS, HOW OFTEN HAVE YOU BEEN BOTHERED BY EMOTIONAL PROBLEMS, SUCH AS FEELING ANXIOUS, DEPRESSED, OR IRRITABLE [ON A SCALE FROM 1 (NEVER) TO 5 (ALWAYS)]?: 1
IN GENERAL, HOW WOULD YOU RATE YOUR MENTAL HEALTH, INCLUDING YOUR MOOD AND YOUR ABILITY TO THINK [ON A SCALE OF 1 (POOR) TO 5 (EXCELLENT)]?: 4
IN GENERAL, HOW WOULD YOU RATE YOUR SATISFACTION WITH YOUR SOCIAL ACTIVITIES AND RELATIONSHIPS [ON A SCALE OF 1 (POOR) TO 5 (EXCELLENT)]?: 5
IN THE PAST 7 DAYS, HOW WOULD YOU RATE YOUR PAIN ON AVERAGE [ON A SCALE FROM 0 (NO PAIN) TO 10 (WORST IMAGINABLE PAIN)]?: 3
IN GENERAL, WOULD YOU SAY YOUR HEALTH IS...[ON A SCALE OF 1 (POOR) TO 5 (EXCELLENT)]: 4
SUM OF RESPONSES TO QUESTIONS 2, 4, 5, & 10: 15
HOW IS THE PROMIS V1.1 BEING ADMINISTERED?: 0
IN GENERAL, WOULD YOU SAY YOUR QUALITY OF LIFE IS...[ON A SCALE OF 1 (POOR) TO 5 (EXCELLENT)]: 5
TO WHAT EXTENT ARE YOU ABLE TO CARRY OUT YOUR EVERYDAY PHYSICAL ACTIVITIES SUCH AS WALKING, CLIMBING STAIRS, CARRYING GROCERIES, OR MOVING A CHAIR [ON A SCALE OF 1 (NOT AT ALL) TO 5 (COMPLETELY)]?: 5

## 2022-07-07 ASSESSMENT — PAIN DESCRIPTION - FREQUENCY: FREQUENCY: INTERMITTENT

## 2022-07-07 ASSESSMENT — PAIN DESCRIPTION - ORIENTATION: ORIENTATION: RIGHT

## 2022-07-07 ASSESSMENT — PAIN DESCRIPTION - PAIN TYPE: TYPE: CHRONIC PAIN

## 2022-07-07 ASSESSMENT — PAIN - FUNCTIONAL ASSESSMENT: PAIN_FUNCTIONAL_ASSESSMENT: ACTIVITIES ARE NOT PREVENTED

## 2022-07-07 ASSESSMENT — PAIN DESCRIPTION - LOCATION: LOCATION: KNEE

## 2022-07-07 ASSESSMENT — PAIN SCALES - GENERAL: PAINLEVEL_OUTOF10: 3

## 2022-07-07 ASSESSMENT — PAIN DESCRIPTION - DESCRIPTORS: DESCRIPTORS: ACHING

## 2022-07-07 NOTE — H&P
History and Physical Service   [facility] 12    HISTORY AND PHYSICAL EXAMINATION            Date of Evaluation: 7/7/2022  Patient name:  Kilo Pappas  MRN:   [id number]  YOB: 1950  PCP:    Josué Harkins MD    History Obtained From:     Patient, medical records    History of Present Illness: This is Kilo Pappas a 66 y.o. male who presents for a pre-admission testing appointment for an upcoming [address] by Ambrosio Franz MD scheduled on 7/27/2022 @ 1000 due to RIGHT KNEE OA. The patient's chief complaint is 4-5 /10 RIGHT KNEE  UP TO 7/10 WHEN WALKING. THE pain which has progressively worsened over the past Patricia Deniz. PROGRESSIVELY WORSE. .  THE PAIN IS AGGRAVATED BY  WALKING A DISTANCE, PIVOTING , OR WALKING ON AN UNEVEN SURFACE.  and is minimally relieved with TYLENOL AND A BRACE. Maurice Maxi Prior treatment includes NONE . Denies recent falls and injuries. HIS KNEE IS UNSTABLE. HE PRESENTS FOR SURGICAL CORRECTION. Sleep apnea questionnaire  1) Do you snore loudly? YES   2) Do you often feel tired, fatigued, or sleepy? YES   3) Has anyone observed you stop breathing or choking/gasping during your sleep? NO   4) Do you have hypertension? YES   5) BMI >35 kg/m2? NO  6) Age > 50? YES   7) Pt is a male? YES     Functional Capacity:   1) Pt IS able to walk 2 city blocks on level ground without SOB. 2) Pt IS able to climb 2 flights of stairs without SOB. 3) Pt IS able to walk up a hill for 1-2 city blocks without SOB.     Past Medical History:     Past Medical History:   Diagnosis Date    Bleeding hemorrhoid     Heartburn     not on medication     History of blood transfusion     realated to bleeding hemrroid, needed 4 units PRBC    Hypertension         Past Surgical History:     Past Surgical History:   Procedure Laterality Date    BAND HEMORRHOIDECTOMY  2018    COLONOSCOPY  02/02/2018    Transanal Colonoscopy with control of bleeding, injection of epinephrine and placement of band x 1.     COLONOSCOPY N/A 2018    colonoscopy performed by Gisel White MD at [facility]        Medications Prior to Admission:     Prior to Admission medications    Medication Sig Start Date End Date Taking? Authorizing Provider   acetaminophen (TYLENOL) 325 MG tablet Take 650 mg by mouth every 6 hours as needed for Pain   Yes Historical Provider, MD   Omega-3 Fatty Acids (FISH OIL PO) Take by mouth daily 2 capsules daily   Yes Historical Provider, MD   Red Yeast Rice Extract (RED YEAST RICE PO) Take by mouth daily   Yes Historical Provider, MD   METAMUCIL FIBER PO Take by mouth Daily   Yes Historical Provider, MD   Glucos-Chondroit-Hyaluron-MSM (GLUCOSAMINE CHONDROITIN JOINT PO) Take by mouth Daily   Yes Historical Provider, MD   telmisartan-hydroCHLOROthiazide (MICARDIS HCT) 40-12.5 MG per tablet  22   Historical Provider, MD        Allergies:     Patient has no known allergies. Social History:     Tobacco:    reports that he has quit smoking. He has never used smokeless tobacco.  Alcohol:      reports current alcohol use. Drug Use:  reports no history of drug use. Family History:     FATHER  NATURALLY AT 80, MOTHER  AT 80 OF COMPLICATIONS AFTER SURGERY    Review of Systems:     Positive and Negative as described in HPI. CONSTITUTIONAL:  Negative for fevers, chills, sweats, fatigue, and weight loss. HEENT:  Negative for glasses, hearing changes, rhinorrhea, and throat pain. RESPIRATORY:  Negative for shortness of breath, cough, congestion, and wheezing. CARDIOVASCULAR: HAS HYPERTENSION , Negative for chest pain, blood clot, irregular heartbeat, and palpitations. GASTROINTESTINAL: HAS GERD  Negative for reflux, nausea, vomiting, diarrhea, constipation, change in bowel habits, and abdominal pain. GENITOURINARY:  Negative for difficulty of urination, burning with urination, and frequency.    INTEGUMENT:  Negative for rash, skin lesions, and easy bruising. Instructed pt to call Dr. Nicole Simmons  as soon as possible if a rash or wound develops prior to surgery. Pt voiced understanding. HEMATOLOGIC/LYMPHATIC:  Negative for swelling/edema. ALLERGIC/IMMUNOLOGIC:  Negative for urticaria and itching. ENDOCRINE:  Negative for increase in thirst, increase in urination, and heat or cold intolerance. MUSCULOSKELETAL: See HPI. NEUROLOGICAL:  Negative for headaches, dizziness, lightheadedness, numbness, and tingling extremities. BEHAVIOR/PSYCH:  Negative for depression and anxiety. Physical Exam:   /89   Pulse 62   Temp 97.5 °F (36.4 °C) (Temporal)   Resp 16   Ht 5' 9\" (1.753 m)   Wt 210 lb 4.8 oz (95.4 kg)   SpO2 97%   BMI 31.06 kg/m²   No results for input(s): POCGLU in the last 72 hours. General Appearance:  Alert, well appearing, and in no acute distress. Mental status:  Oriented to person, place, and time. Head:  Normocephalic and atraumatic. Eye:  No icterus, redness, pupils equal and reactive, extraocular eye movements intact, and conjunctiva clear. Ear:  Hearing grossly intact. Nose:  No drainage noted. Mouth:  Mucous membranes moist.  Neck:  Supple and no carotid bruits noted. Lungs:  Bilateral equal air entry, clear to auscultation, no wheezing, rales or rhonchi, and normal effort. Cardiovascular:  Normal rate, regular rhythm, no murmur, gallop, or rub. Abdomen:  Soft, non-tender, non-distended, and active bowel sounds. Neurologic:  Normal speech and cranial nerves II through XII grossly intact. Strength 5/5 bilaterally. Skin:  No gross lesions, rashes, bruising, or bleeding on exposed skin area. Extremities:  Posterior tibial pulses 2+ bilaterally. No pedal edema. No calf tenderness with palpation. Psych:  Normal affect.      Investigations:      Laboratory Testing:  Recent Results (from the past 24 hour(s))   CBC    Collection Time: 07/07/22  9:52 AM   Result Value Ref Range    WBC 5.2 3.5 - 11.3 k/uL    RBC 4. 42 4.21 - 5.77 m/uL    Hemoglobin 14.3 13.0 - 17.0 g/dL    Hematocrit 42.0 40.7 - 50.3 %    MCV 95.0 82.6 - 102.9 fL    MCH 32.4 25.2 - 33.5 pg    MCHC 34.0 28.4 - 34.8 g/dL    RDW 12.4 11.8 - 14.4 %    Platelets 485 937 - 673 k/uL    MPV 10.0 8.1 - 13.5 fL    NRBC Automated 0.0 0.0 per 100 WBC   BUN & Creatinine    Collection Time: 07/07/22  9:52 AM   Result Value Ref Range    BUN 18 8 - 23 mg/dL    CREATININE 1.09 0.70 - 1.20 mg/dL    GFR Non-African American >60 >60 mL/min    GFR African American >60 >60 mL/min    GFR Comment         EKG 12 Lead    Collection Time: 07/07/22 10:12 AM   Result Value Ref Range    Ventricular Rate 58 BPM    Atrial Rate 58 BPM    P-R Interval 212 ms    QRS Duration 92 ms    Q-T Interval 434 ms    QTc Calculation (Bazett) 426 ms    P Axis 30 degrees    R Axis -49 degrees       Recent Labs     07/07/22  0952   HGB 14.3   HCT 42.0   WBC 5.2   MCV 95.0   BUN 18   CREATININE 1.09       No results for input(s): COVID19 in the last 720 hours. *Please note that labs listed above are the most recent lab values available in EPIC at the time of the visit and additional labs may have been drawn or resulted since that time. Imaging/Diagnostics:      No results found. EKG: OF 7/7/2022 IS ON THE SHORT CHART See Epic. Diagnosis:      1. RIGHT KNEE OA. Plans:     1.  RIGHT KNEE TOTAL ARTHROPLASTY - [address]      EDWARD Adler - CNP  7/7/2022  10:55 AM

## 2022-07-07 NOTE — PRE-PROCEDURE INSTRUCTIONS
[facility] ON Wednesday, 07/27/2022 at 08:00 AM    Once you enter the hospital lobby, take the elevators to the second floor. Check-In is at the surgery registration desk. Continue to take your home medications as you normally do up to and including the night before surgery with the exception of any blood thinning medications. Please stop any blood thinning medications as directed by your surgeon or prescribing physician. Failure to stop certain medications may interfere with your scheduled surgery. These may include:  Aspirin, Warfarin (Coumadin), Clopidogrel (Plavix), Ibuprofen (Motrin, Advil), Naproxen (Aleve), Meloxicam (Mobic), Celecoxib (Celebrex), Eliquis, Pradaxa, Xarelto, Effient, Fish Oil, Herbal supplements. PLEASE STOP TAKING ALL VITAMINS AND SUPPLEMENTS 7 DAYS PRIOR TO SURGERY         Please take the following medication(s) the day of surgery with a small sip of water:  TELMISARTAN-HYDROCHLOROTHIAZIDE (MICARDIS HCT)         PREPARING FOR YOUR SURGERY:     Before surgery, you can play an important role in your own health. Because skin is not sterile, we need to be sure that your skin is as free of germs as possible before surgery by carefully washing before surgery. Preparing or prepping skin before surgery can reduce the risk of a surgical site infection.   Do not shave the area of your body where your surgery will be performed unless you received specific permission from your physician. You will need to shower at home the night before surgery and the morning of surgery with a special soap called chlorhexidine gluconate (CHG*). *Not to be used by people allergic to Chlorhexidine Gluconate (CHG). Following these instructions will help you be sure that your skin is clean before surgery. Instructions on cleaning your skin before surgery: The night before your surgery:      You will need to shower with warm water (not hot) and the CHG soap.        Use a Chart/orders reviewed and signed. Patient will need 31-90-day follow-up appointment. Looks like appointment is still scheduled 6/9/2022? you.    · Do not wear any jewelry or body piercings day of surgery. Also, NO lotion, perfume or deodorant to be used the day of surgery. No nail polish on the operative extremity (arm/leg surgeries)    · If you are staying overnight with us, please bring a small bag of necessary personal items.  Please wear loose, comfortable clothing. If you are potentially going to have a cast or brace bring clothing that will fit over them.  In case of illness - If you have cold or flu like symptoms (high fever, runny nose, sore throat, cough, etc.) rash, nausea, vomiting, loose stools, and/or recent contact with someone who has a contagious disease (chicken pox, measles, etc.) Please call your doctor before coming to the hospital.         Day of Surgery/Procedure:    As a patient at [facility] you can expect quality medical and nursing care that is centered on your individual needs. Our goal is to make your surgical experience as comfortable as possible    . Transportation After Your Surgery/Procedure: You will need a friend or family member to drive you home after your procedure. Your  must be 25years of age or older and able to sign off on your discharge instructions. A taxi cab or any other form of public transportation is not acceptable. Your friend or family member must stay at the hospital throughout your procedure. Someone must remain with you for the first 24 hours after your surgery if you receive anesthesia or medication. If you do not have someone to stay with you, your procedure may be cancelled.       If you have any other questions regarding your procedure or the day of surgery, please call [phone]      _________________________  ____________________________  Signature (patient)                                       Signature (provider)             Date

## 2022-07-08 LAB
MRSA, DNA, NASAL: NEGATIVE
SPECIMEN DESCRIPTION: NORMAL

## 2022-07-18 NOTE — PROGRESS NOTES
[facility] ([facility]) Joint Replacement Pre-surgical Assessment    Scheduled Surgery Date: 07/27/2022  Surgery Time: 1000    Surgeon: Sharon Cheatham  Procedure: right Total Knee    Primary Insurance Coverage [facility] COMPLETE  Pre-op class attended YES    PCP: Carlos Enrique Perry MD  Clearance received by PCP: Yes    Anticipated Discharge Plan: home  Agency (if applicable): UNSURE    Significant PMH:   Surgical History    Procedure Laterality Date Comment Source   BAND HEMORRHOIDECTOMY  2018     COLONOSCOPY  02/02/2018 Transanal Colonoscopy with control of bleeding, injection of epinephrine and placement of band x 1.     COLONOSCOPY N/A 2/2/2018 colonoscopy performed by Ira Farrell MD at [address] [place] [address]        ED Notes    ED Notes      Medical History    Diagnosis Date Comment Source   Bleeding hemorrhoid      Heartburn  not on medication     History of blood transfusion  realated to bleeding hemrroid, needed 4 units PRBC    Hypertension             Smoking history: none    Alcohol history: Never drinks    Concerns prior to surgery: PT MET WITH PT/OT AFTER CLASS.     Electronically signed by: Napoleon Jim RN on 7/18/2022 at 9:59 AM

## 2022-07-26 ENCOUNTER — ANESTHESIA EVENT (OUTPATIENT)
Dept: OPERATING ROOM | Age: 72
End: 2022-07-26
Payer: MEDICARE

## 2022-07-27 ENCOUNTER — HOSPITAL ENCOUNTER (OUTPATIENT)
Age: 72
Discharge: HOME | End: 2022-07-27
Attending: ORTHOPAEDIC SURGERY | Admitting: ORTHOPAEDIC SURGERY
Payer: MEDICARE

## 2022-07-27 ENCOUNTER — ANESTHESIA (OUTPATIENT)
Dept: OPERATING ROOM | Age: 72
End: 2022-07-27
Payer: MEDICARE

## 2022-07-27 ENCOUNTER — APPOINTMENT (OUTPATIENT)
Dept: GENERAL RADIOLOGY | Age: 72
End: 2022-07-27
Attending: ORTHOPAEDIC SURGERY
Payer: MEDICARE

## 2022-07-27 VITALS
DIASTOLIC BLOOD PRESSURE: 80 MMHG | WEIGHT: 208 LBS | HEIGHT: 68 IN | RESPIRATION RATE: 16 BRPM | SYSTOLIC BLOOD PRESSURE: 122 MMHG | TEMPERATURE: 97.4 F | OXYGEN SATURATION: 95 % | HEART RATE: 88 BPM | BODY MASS INDEX: 31.52 KG/M2

## 2022-07-27 DIAGNOSIS — Z96.651 S/P TKR (TOTAL KNEE REPLACEMENT) USING CEMENT, RIGHT: Primary | ICD-10-CM

## 2022-07-27 PROCEDURE — 97530 THERAPEUTIC ACTIVITIES: CPT

## 2022-07-27 PROCEDURE — 6370000000 HC RX 637 (ALT 250 FOR IP): Performed by: ANESTHESIOLOGY

## 2022-07-27 PROCEDURE — 73560 X-RAY EXAM OF KNEE 1 OR 2: CPT

## 2022-07-27 PROCEDURE — 2500000003 HC RX 250 WO HCPCS: Performed by: ANESTHESIOLOGY

## 2022-07-27 PROCEDURE — 3600000015 HC SURGERY LEVEL 5 ADDTL 15MIN: Performed by: ORTHOPAEDIC SURGERY

## 2022-07-27 PROCEDURE — 3600000005 HC SURGERY LEVEL 5 BASE: Performed by: ORTHOPAEDIC SURGERY

## 2022-07-27 PROCEDURE — 97166 OT EVAL MOD COMPLEX 45 MIN: CPT

## 2022-07-27 PROCEDURE — 97535 SELF CARE MNGMENT TRAINING: CPT

## 2022-07-27 PROCEDURE — 2709999900 HC NON-CHARGEABLE SUPPLY: Performed by: ORTHOPAEDIC SURGERY

## 2022-07-27 PROCEDURE — 3700000000 HC ANESTHESIA ATTENDED CARE: Performed by: ORTHOPAEDIC SURGERY

## 2022-07-27 PROCEDURE — 97162 PT EVAL MOD COMPLEX 30 MIN: CPT

## 2022-07-27 PROCEDURE — 2500000003 HC RX 250 WO HCPCS: Performed by: ORTHOPAEDIC SURGERY

## 2022-07-27 PROCEDURE — 97116 GAIT TRAINING THERAPY: CPT

## 2022-07-27 PROCEDURE — 6370000000 HC RX 637 (ALT 250 FOR IP): Performed by: STUDENT IN AN ORGANIZED HEALTH CARE EDUCATION/TRAINING PROGRAM

## 2022-07-27 PROCEDURE — C1776 JOINT DEVICE (IMPLANTABLE): HCPCS | Performed by: ORTHOPAEDIC SURGERY

## 2022-07-27 PROCEDURE — 6360000002 HC RX W HCPCS: Performed by: ORTHOPAEDIC SURGERY

## 2022-07-27 PROCEDURE — 6360000002 HC RX W HCPCS: Performed by: ANESTHESIOLOGY

## 2022-07-27 PROCEDURE — 2580000003 HC RX 258: Performed by: STUDENT IN AN ORGANIZED HEALTH CARE EDUCATION/TRAINING PROGRAM

## 2022-07-27 PROCEDURE — 7100000000 HC PACU RECOVERY - FIRST 15 MIN: Performed by: ORTHOPAEDIC SURGERY

## 2022-07-27 PROCEDURE — 3700000001 HC ADD 15 MINUTES (ANESTHESIA): Performed by: ORTHOPAEDIC SURGERY

## 2022-07-27 PROCEDURE — C1713 ANCHOR/SCREW BN/BN,TIS/BN: HCPCS | Performed by: ORTHOPAEDIC SURGERY

## 2022-07-27 PROCEDURE — 7100000001 HC PACU RECOVERY - ADDTL 15 MIN: Performed by: ORTHOPAEDIC SURGERY

## 2022-07-27 PROCEDURE — 64447 NJX AA&/STRD FEMORAL NRV IMG: CPT | Performed by: ANESTHESIOLOGY

## 2022-07-27 PROCEDURE — 2580000003 HC RX 258: Performed by: ORTHOPAEDIC SURGERY

## 2022-07-27 PROCEDURE — C9290 INJ, BUPIVACAINE LIPOSOME: HCPCS | Performed by: ANESTHESIOLOGY

## 2022-07-27 DEVICE — COMPONENT PAT DIA34MM THK85MM KNEE TI ALLY S STL UHMWPE 3 PE: Type: IMPLANTABLE DEVICE | Site: KNEE | Status: FUNCTIONAL

## 2022-07-27 DEVICE — BEARING TIB AP71MM ML75MM THK10MM KNEE ARCM POST STBL MOD: Type: IMPLANTABLE DEVICE | Site: KNEE | Status: FUNCTIONAL

## 2022-07-27 DEVICE — TRAY TIB L75MM KNEE CO CHROM FIN MOD INTLOK CEM VANGUARD: Type: IMPLANTABLE DEVICE | Site: KNEE | Status: FUNCTIONAL

## 2022-07-27 DEVICE — CEMENT BNE 40GM HI VISC RADPQ FOR REV SURG: Type: IMPLANTABLE DEVICE | Site: KNEE | Status: FUNCTIONAL

## 2022-07-27 DEVICE — IMPLANTABLE DEVICE: Type: IMPLANTABLE DEVICE | Site: KNEE | Status: FUNCTIONAL

## 2022-07-27 RX ORDER — SODIUM CHLORIDE 0.9 % (FLUSH) 0.9 %
5-40 SYRINGE (ML) INJECTION EVERY 12 HOURS SCHEDULED
Status: DISCONTINUED | OUTPATIENT
Start: 2022-07-27 | End: 2022-07-27 | Stop reason: HOSPADM

## 2022-07-27 RX ORDER — OXYCODONE HYDROCHLORIDE AND ACETAMINOPHEN 5; 325 MG/1; MG/1
1 TABLET ORAL EVERY 6 HOURS PRN
Qty: 28 TABLET | Refills: 0 | Status: SHIPPED | OUTPATIENT
Start: 2022-07-27 | End: 2022-08-03

## 2022-07-27 RX ORDER — KETOROLAC TROMETHAMINE 30 MG/ML
30 INJECTION, SOLUTION INTRAMUSCULAR; INTRAVENOUS ONCE
Status: DISCONTINUED | OUTPATIENT
Start: 2022-07-27 | End: 2022-07-27

## 2022-07-27 RX ORDER — SODIUM CHLORIDE 9 MG/ML
INJECTION, SOLUTION INTRAVENOUS PRN
Status: DISCONTINUED | OUTPATIENT
Start: 2022-07-27 | End: 2022-07-27 | Stop reason: HOSPADM

## 2022-07-27 RX ORDER — OXYCODONE HYDROCHLORIDE 5 MG/1
10 TABLET ORAL EVERY 4 HOURS PRN
Status: DISCONTINUED | OUTPATIENT
Start: 2022-07-27 | End: 2022-07-27 | Stop reason: HOSPADM

## 2022-07-27 RX ORDER — TRANEXAMIC ACID 650 1/1
1300 TABLET ORAL ONCE
Status: DISCONTINUED | OUTPATIENT
Start: 2022-07-27 | End: 2022-07-27 | Stop reason: HOSPADM

## 2022-07-27 RX ORDER — FENTANYL CITRATE 50 UG/ML
INJECTION, SOLUTION INTRAMUSCULAR; INTRAVENOUS PRN
Status: DISCONTINUED | OUTPATIENT
Start: 2022-07-27 | End: 2022-07-27 | Stop reason: SDUPTHER

## 2022-07-27 RX ORDER — HYDROMORPHONE HYDROCHLORIDE 1 MG/ML
0.25 INJECTION, SOLUTION INTRAMUSCULAR; INTRAVENOUS; SUBCUTANEOUS EVERY 5 MIN PRN
Status: DISCONTINUED | OUTPATIENT
Start: 2022-07-27 | End: 2022-07-27 | Stop reason: HOSPADM

## 2022-07-27 RX ORDER — TELMISARTAN AND HYDROCHLORTHIAZIDE 40; 12.5 MG/1; MG/1
1 TABLET ORAL DAILY
Status: DISCONTINUED | OUTPATIENT
Start: 2022-07-27 | End: 2022-07-27 | Stop reason: HOSPADM

## 2022-07-27 RX ORDER — ASPIRIN 325 MG
325 TABLET, DELAYED RELEASE (ENTERIC COATED) ORAL 2 TIMES DAILY
Qty: 70 TABLET | Refills: 0 | Status: SHIPPED | OUTPATIENT
Start: 2022-07-27 | End: 2022-08-31

## 2022-07-27 RX ORDER — LIDOCAINE HYDROCHLORIDE 20 MG/ML
INJECTION, SOLUTION EPIDURAL; INFILTRATION; INTRACAUDAL; PERINEURAL PRN
Status: DISCONTINUED | OUTPATIENT
Start: 2022-07-27 | End: 2022-07-27 | Stop reason: SDUPTHER

## 2022-07-27 RX ORDER — DEXAMETHASONE SODIUM PHOSPHATE 10 MG/ML
INJECTION, SOLUTION INTRAMUSCULAR; INTRAVENOUS PRN
Status: DISCONTINUED | OUTPATIENT
Start: 2022-07-27 | End: 2022-07-27 | Stop reason: SDUPTHER

## 2022-07-27 RX ORDER — KETOROLAC TROMETHAMINE 30 MG/ML
INJECTION, SOLUTION INTRAMUSCULAR; INTRAVENOUS
Status: DISCONTINUED
Start: 2022-07-27 | End: 2022-07-27

## 2022-07-27 RX ORDER — SODIUM CHLORIDE 0.9 % (FLUSH) 0.9 %
5-40 SYRINGE (ML) INJECTION PRN
Status: DISCONTINUED | OUTPATIENT
Start: 2022-07-27 | End: 2022-07-27 | Stop reason: HOSPADM

## 2022-07-27 RX ORDER — ONDANSETRON 2 MG/ML
INJECTION INTRAMUSCULAR; INTRAVENOUS PRN
Status: DISCONTINUED | OUTPATIENT
Start: 2022-07-27 | End: 2022-07-27 | Stop reason: SDUPTHER

## 2022-07-27 RX ORDER — SODIUM CHLORIDE 9 MG/ML
INJECTION, SOLUTION INTRAVENOUS CONTINUOUS
Status: DISCONTINUED | OUTPATIENT
Start: 2022-07-27 | End: 2022-07-27

## 2022-07-27 RX ORDER — FAMOTIDINE 20 MG/1
20 TABLET, FILM COATED ORAL ONCE
Status: DISCONTINUED | OUTPATIENT
Start: 2022-07-27 | End: 2022-07-27 | Stop reason: HOSPADM

## 2022-07-27 RX ORDER — SODIUM CHLORIDE 9 MG/ML
INJECTION, SOLUTION INTRAVENOUS CONTINUOUS
Status: DISCONTINUED | OUTPATIENT
Start: 2022-07-27 | End: 2022-07-27 | Stop reason: HOSPADM

## 2022-07-27 RX ORDER — CELECOXIB 200 MG/1
200 CAPSULE ORAL ONCE
Status: DISCONTINUED | OUTPATIENT
Start: 2022-07-27 | End: 2022-07-27

## 2022-07-27 RX ORDER — BUPIVACAINE HYDROCHLORIDE 2.5 MG/ML
INJECTION, SOLUTION EPIDURAL; INFILTRATION; INTRACAUDAL
Status: DISCONTINUED
Start: 2022-07-27 | End: 2022-07-27

## 2022-07-27 RX ORDER — EPHEDRINE SULFATE/0.9% NACL/PF 50 MG/5 ML
SYRINGE (ML) INTRAVENOUS PRN
Status: DISCONTINUED | OUTPATIENT
Start: 2022-07-27 | End: 2022-07-27 | Stop reason: SDUPTHER

## 2022-07-27 RX ORDER — POLYETHYLENE GLYCOL 3350 17 G/17G
17 POWDER, FOR SOLUTION ORAL DAILY
Status: DISCONTINUED | OUTPATIENT
Start: 2022-07-27 | End: 2022-07-27 | Stop reason: HOSPADM

## 2022-07-27 RX ORDER — ONDANSETRON 2 MG/ML
4 INJECTION INTRAMUSCULAR; INTRAVENOUS EVERY 6 HOURS PRN
Status: DISCONTINUED | OUTPATIENT
Start: 2022-07-27 | End: 2022-07-27 | Stop reason: HOSPADM

## 2022-07-27 RX ORDER — PROPOFOL 10 MG/ML
INJECTION, EMULSION INTRAVENOUS PRN
Status: DISCONTINUED | OUTPATIENT
Start: 2022-07-27 | End: 2022-07-27 | Stop reason: SDUPTHER

## 2022-07-27 RX ORDER — ONDANSETRON 2 MG/ML
4 INJECTION INTRAMUSCULAR; INTRAVENOUS
Status: DISCONTINUED | OUTPATIENT
Start: 2022-07-27 | End: 2022-07-27 | Stop reason: HOSPADM

## 2022-07-27 RX ORDER — SODIUM CHLORIDE, SODIUM LACTATE, POTASSIUM CHLORIDE, CALCIUM CHLORIDE 600; 310; 30; 20 MG/100ML; MG/100ML; MG/100ML; MG/100ML
INJECTION, SOLUTION INTRAVENOUS CONTINUOUS
Status: DISCONTINUED | OUTPATIENT
Start: 2022-07-27 | End: 2022-07-27

## 2022-07-27 RX ORDER — BUPIVACAINE HYDROCHLORIDE 2.5 MG/ML
INJECTION, SOLUTION EPIDURAL; INFILTRATION; INTRACAUDAL
Status: COMPLETED | OUTPATIENT
Start: 2022-07-27 | End: 2022-07-27

## 2022-07-27 RX ORDER — GABAPENTIN 300 MG/1
300 CAPSULE ORAL ONCE
Status: COMPLETED | OUTPATIENT
Start: 2022-07-27 | End: 2022-07-27

## 2022-07-27 RX ORDER — ACETAMINOPHEN 325 MG/1
650 TABLET ORAL EVERY 6 HOURS
Status: DISCONTINUED | OUTPATIENT
Start: 2022-07-27 | End: 2022-07-27 | Stop reason: HOSPADM

## 2022-07-27 RX ORDER — LIDOCAINE HYDROCHLORIDE 10 MG/ML
1 INJECTION, SOLUTION EPIDURAL; INFILTRATION; INTRACAUDAL; PERINEURAL
Status: DISCONTINUED | OUTPATIENT
Start: 2022-07-27 | End: 2022-07-27 | Stop reason: HOSPADM

## 2022-07-27 RX ORDER — SENNA PLUS 8.6 MG/1
1 TABLET ORAL 2 TIMES DAILY PRN
Status: DISCONTINUED | OUTPATIENT
Start: 2022-07-27 | End: 2022-07-27 | Stop reason: HOSPADM

## 2022-07-27 RX ORDER — KETOROLAC TROMETHAMINE 30 MG/ML
15 INJECTION, SOLUTION INTRAMUSCULAR; INTRAVENOUS EVERY 6 HOURS PRN
Status: DISCONTINUED | OUTPATIENT
Start: 2022-07-27 | End: 2022-07-27 | Stop reason: HOSPADM

## 2022-07-27 RX ORDER — ACETAMINOPHEN 500 MG
1000 TABLET ORAL ONCE
Status: DISCONTINUED | OUTPATIENT
Start: 2022-07-27 | End: 2022-07-27 | Stop reason: SDUPTHER

## 2022-07-27 RX ORDER — MIDAZOLAM HYDROCHLORIDE 1 MG/ML
2 INJECTION INTRAMUSCULAR; INTRAVENOUS ONCE
Status: COMPLETED | OUTPATIENT
Start: 2022-07-27 | End: 2022-07-27

## 2022-07-27 RX ORDER — DEXAMETHASONE SODIUM PHOSPHATE 10 MG/ML
10 INJECTION, SOLUTION INTRAMUSCULAR; INTRAVENOUS ONCE
Status: DISCONTINUED | OUTPATIENT
Start: 2022-07-27 | End: 2022-07-27 | Stop reason: HOSPADM

## 2022-07-27 RX ORDER — FENTANYL CITRATE 50 UG/ML
25 INJECTION, SOLUTION INTRAMUSCULAR; INTRAVENOUS EVERY 5 MIN PRN
Status: DISCONTINUED | OUTPATIENT
Start: 2022-07-27 | End: 2022-07-27 | Stop reason: HOSPADM

## 2022-07-27 RX ORDER — ASPIRIN 325 MG
325 TABLET, DELAYED RELEASE (ENTERIC COATED) ORAL 2 TIMES DAILY
Qty: 42 TABLET | Refills: 0 | Status: SHIPPED | OUTPATIENT
Start: 2022-07-27 | End: 2022-07-27 | Stop reason: SDUPTHER

## 2022-07-27 RX ORDER — ONDANSETRON 4 MG/1
4 TABLET, ORALLY DISINTEGRATING ORAL EVERY 8 HOURS PRN
Status: DISCONTINUED | OUTPATIENT
Start: 2022-07-27 | End: 2022-07-27 | Stop reason: HOSPADM

## 2022-07-27 RX ORDER — CEPHALEXIN 500 MG/1
500 CAPSULE ORAL 4 TIMES DAILY
Qty: 8 CAPSULE | Refills: 0 | Status: SHIPPED | OUTPATIENT
Start: 2022-07-27 | End: 2022-07-29

## 2022-07-27 RX ORDER — OXYCODONE HYDROCHLORIDE 5 MG/1
5 TABLET ORAL EVERY 4 HOURS PRN
Status: DISCONTINUED | OUTPATIENT
Start: 2022-07-27 | End: 2022-07-27 | Stop reason: HOSPADM

## 2022-07-27 RX ORDER — HYDROMORPHONE HCL 110MG/55ML
PATIENT CONTROLLED ANALGESIA SYRINGE INTRAVENOUS PRN
Status: DISCONTINUED | OUTPATIENT
Start: 2022-07-27 | End: 2022-07-27 | Stop reason: SDUPTHER

## 2022-07-27 RX ORDER — MAGNESIUM HYDROXIDE 1200 MG/15ML
LIQUID ORAL CONTINUOUS PRN
Status: COMPLETED | OUTPATIENT
Start: 2022-07-27 | End: 2022-07-27

## 2022-07-27 RX ORDER — ACETAMINOPHEN 500 MG
1000 TABLET ORAL ONCE
Status: COMPLETED | OUTPATIENT
Start: 2022-07-27 | End: 2022-07-27

## 2022-07-27 RX ADMIN — Medication 50 MCG: at 09:47

## 2022-07-27 RX ADMIN — Medication 50 MCG: at 10:12

## 2022-07-27 RX ADMIN — POLYETHYLENE GLYCOL 3350 17 G: 17 POWDER, FOR SOLUTION ORAL at 15:10

## 2022-07-27 RX ADMIN — PROPOFOL 150 MG: 10 INJECTION, EMULSION INTRAVENOUS at 09:47

## 2022-07-27 RX ADMIN — HYDROMORPHONE HYDROCHLORIDE 0.5 MG: 2 INJECTION INTRAMUSCULAR; INTRAVENOUS; SUBCUTANEOUS at 10:21

## 2022-07-27 RX ADMIN — Medication 50 MCG: at 10:10

## 2022-07-27 RX ADMIN — DEXAMETHASONE SODIUM PHOSPHATE 10 MG: 10 INJECTION, SOLUTION INTRAMUSCULAR; INTRAVENOUS at 10:00

## 2022-07-27 RX ADMIN — BUPIVACAINE 10 ML: 13.3 INJECTION, SUSPENSION, LIPOSOMAL INFILTRATION at 08:45

## 2022-07-27 RX ADMIN — Medication 50 MCG: at 10:15

## 2022-07-27 RX ADMIN — Medication 10 MG: at 09:58

## 2022-07-27 RX ADMIN — SODIUM CHLORIDE, POTASSIUM CHLORIDE, SODIUM LACTATE AND CALCIUM CHLORIDE: 600; 310; 30; 20 INJECTION, SOLUTION INTRAVENOUS at 08:53

## 2022-07-27 RX ADMIN — BUPIVACAINE HYDROCHLORIDE 20 ML: 2.5 INJECTION, SOLUTION EPIDURAL; INFILTRATION; INTRACAUDAL; PERINEURAL at 08:45

## 2022-07-27 RX ADMIN — HYDROMORPHONE HYDROCHLORIDE 0.5 MG: 2 INJECTION INTRAMUSCULAR; INTRAVENOUS; SUBCUTANEOUS at 10:26

## 2022-07-27 RX ADMIN — OXYCODONE 10 MG: 5 TABLET ORAL at 15:10

## 2022-07-27 RX ADMIN — GABAPENTIN 300 MG: 300 CAPSULE ORAL at 08:38

## 2022-07-27 RX ADMIN — ACETAMINOPHEN 650 MG: 325 TABLET, FILM COATED ORAL at 15:10

## 2022-07-27 RX ADMIN — HYDROMORPHONE HYDROCHLORIDE 1 MG: 2 INJECTION INTRAMUSCULAR; INTRAVENOUS; SUBCUTANEOUS at 11:25

## 2022-07-27 RX ADMIN — ONDANSETRON 4 MG: 2 INJECTION INTRAMUSCULAR; INTRAVENOUS at 11:05

## 2022-07-27 RX ADMIN — CEFAZOLIN 2000 MG: 10 INJECTION, POWDER, FOR SOLUTION INTRAVENOUS at 09:57

## 2022-07-27 RX ADMIN — MIDAZOLAM 1 MG: 1 INJECTION INTRAMUSCULAR; INTRAVENOUS at 08:52

## 2022-07-27 RX ADMIN — ACETAMINOPHEN 1000 MG: 500 TABLET ORAL at 08:38

## 2022-07-27 RX ADMIN — FENTANYL CITRATE 25 MCG: 50 INJECTION, SOLUTION INTRAMUSCULAR; INTRAVENOUS at 11:52

## 2022-07-27 RX ADMIN — Medication 10 MG: at 10:54

## 2022-07-27 RX ADMIN — LIDOCAINE HYDROCHLORIDE 100 MG: 20 INJECTION, SOLUTION EPIDURAL; INFILTRATION; INTRACAUDAL; PERINEURAL at 09:47

## 2022-07-27 ASSESSMENT — PAIN - FUNCTIONAL ASSESSMENT
PAIN_FUNCTIONAL_ASSESSMENT: 0-10
PAIN_FUNCTIONAL_ASSESSMENT: PREVENTS OR INTERFERES SOME ACTIVE ACTIVITIES AND ADLS

## 2022-07-27 ASSESSMENT — PAIN DESCRIPTION - ORIENTATION
ORIENTATION: RIGHT

## 2022-07-27 ASSESSMENT — PAIN DESCRIPTION - LOCATION
LOCATION: KNEE

## 2022-07-27 ASSESSMENT — PAIN DESCRIPTION - FREQUENCY
FREQUENCY: CONTINUOUS
FREQUENCY: CONTINUOUS

## 2022-07-27 ASSESSMENT — PAIN SCALES - GENERAL
PAINLEVEL_OUTOF10: 5
PAINLEVEL_OUTOF10: 7
PAINLEVEL_OUTOF10: 3

## 2022-07-27 ASSESSMENT — PAIN DESCRIPTION - ONSET
ONSET: ON-GOING
ONSET: PROGRESSIVE

## 2022-07-27 ASSESSMENT — PAIN DESCRIPTION - PAIN TYPE
TYPE: SURGICAL PAIN
TYPE: SURGICAL PAIN

## 2022-07-27 ASSESSMENT — PAIN DESCRIPTION - DESCRIPTORS
DESCRIPTORS: PATIENT UNABLE TO DESCRIBE
DESCRIPTORS: ACHING;DISCOMFORT

## 2022-07-27 NOTE — CARE COORDINATION
Arjun ordered through [facility]. Will be delivered to patient's room.

## 2022-07-27 NOTE — ANESTHESIA PRE PROCEDURE
Department of Anesthesiology  Preprocedure Note       Name:  Jero Harp   Age:  66 y.o.  :  1950                                          MRN:  [id number]         Date:  2022      Surgeon: Colin Amaya):  Rambo Brooks MD    Procedure: Procedure(s):  RIGHT KNEE TOTAL ARTHROPLASTY - Sherledonnie Mustard BIOMET    Medications prior to admission:   Prior to Admission medications    Medication Sig Start Date End Date Taking? Authorizing Provider   telmisartan-hydroCHLOROthiazide (MICARDIS HCT) 40-12.5 MG per tablet  22   Historical Provider, MD   acetaminophen (TYLENOL) 325 MG tablet Take 650 mg by mouth every 6 hours as needed for Pain    Historical Provider, MD   Omega-3 Fatty Acids (FISH OIL PO) Take by mouth daily 2 capsules daily    Historical Provider, MD   Red Yeast Rice Extract (RED YEAST RICE PO) Take by mouth daily    Historical Provider, MD   METAMUCIL FIBER PO Take by mouth Daily    Historical Provider, MD   Glucos-Chondroit-Hyaluron-MSM (GLUCOSAMINE CHONDROITIN JOINT PO) Take by mouth Daily    Historical Provider, MD       Current medications:    No current facility-administered medications for this encounter.        Allergies:  No Known Allergies    Problem List:    Patient Active Problem List   Diagnosis Code    Gastrointestinal hemorrhage associated with anorectal source K62.5    Hypotension I95.9    Acute blood loss anemia D62    Vasovagal syncope R55    Hypotension I95.9    Lower GI bleed K92.2       Past Medical History:        Diagnosis Date    Bleeding hemorrhoid     Heartburn     not on medication     History of blood transfusion     realated to bleeding hemrroid, needed 4 units PRBC    Hypertension        Past Surgical History:        Procedure Laterality Date    BAND HEMORRHOIDECTOMY      COLONOSCOPY  2018    Transanal Colonoscopy with control of bleeding, injection of epinephrine and placement of band x 1.     COLONOSCOPY N/A 2018    colonoscopy performed by Darius Wilson MD at [address] History:    Social History     Tobacco Use    Smoking status: Former    Smokeless tobacco: Never    Tobacco comments:     quit dec 1979   Substance Use Topics    Alcohol use: Yes     Comment: social                                 Counseling given: Not Answered  Tobacco comments: quit dec 1979      Vital Signs (Current): There were no vitals filed for this visit. BP Readings from Last 3 Encounters:   07/07/22 137/89   02/03/18 122/75   02/02/18 (!) 92/52       NPO Status:                                                                                 BMI:   Wt Readings from Last 3 Encounters:   07/07/22 210 lb 4.8 oz (95.4 kg)   02/02/18 210 lb (95.3 kg)     There is no height or weight on file to calculate BMI.    CBC:   Lab Results   Component Value Date/Time    WBC 5.2 07/07/2022 09:52 AM    RBC 4.42 07/07/2022 09:52 AM    HGB 14.3 07/07/2022 09:52 AM    HCT 42.0 07/07/2022 09:52 AM    MCV 95.0 07/07/2022 09:52 AM    RDW 12.4 07/07/2022 09:52 AM     07/07/2022 09:52 AM       CMP:   Lab Results   Component Value Date/Time     02/03/2018 05:18 AM    K 3.8 02/03/2018 05:18 AM     02/03/2018 05:18 AM    CO2 21 02/03/2018 05:18 AM    BUN 18 07/07/2022 09:52 AM    CREATININE 1.09 07/07/2022 09:52 AM    GFRAA >60 07/07/2022 09:52 AM    LABGLOM >60 07/07/2022 09:52 AM    GLUCOSE 109 02/03/2018 05:18 AM    PROT 4.7 02/03/2018 05:18 AM    CALCIUM 7.3 02/03/2018 05:18 AM    BILITOT 0.76 02/03/2018 05:18 AM    ALKPHOS 42 02/03/2018 05:18 AM    AST 11 02/03/2018 05:18 AM    ALT 13 02/03/2018 05:18 AM       POC Tests: No results for input(s): POCGLU, POCNA, POCK, POCCL, POCBUN, POCHEMO, POCHCT in the last 72 hours.     Coags:   Lab Results   Component Value Date/Time    PROTIME 10.7 02/02/2018 05:08 AM    INR 1.0 02/02/2018 05:08 AM    APTT 19.4 02/02/2018 05:08 AM       HCG (If Applicable): No results found for: PREGTESTUR, PREGSERUM, HCG, HCGQUANT     ABGs: No results found for: PHART, PO2ART, QLJ6BPF, ZAE9XNU, BEART, R3FUJYNO     Type & Screen (If Applicable):  No results found for: LABABO, LABRH    Drug/Infectious Status (If Applicable):  No results found for: HIV, HEPCAB    COVID-19 Screening (If Applicable): No results found for: COVID19        Anesthesia Evaluation   no history of anesthetic complications:   Airway: Mallampati: II  TM distance: >3 FB        Dental:          Pulmonary:Negative Pulmonary ROS and normal exam                               Cardiovascular:    (+) hypertension:,     (-)  angina             ROS comment: 2018     CONCLUSIONS     Summary  Technically difficult study. Borderline left ventricular hypertrophy with normal systolic function. Estimated ejection fraction is 55 % . No significant valvular abnormalities. No pericardial effusion.        Neuro/Psych:   Negative Neuro/Psych ROS              GI/Hepatic/Renal: Neg GI/Hepatic/Renal ROS       (-) GERD       Endo/Other:    (+) : arthritis: OA., .                 Abdominal:             Vascular: Other Findings:           Anesthesia Plan      general and regional     ASA 2     (Lma)  Induction: intravenous. MIPS: Postoperative opioids intended and Prophylactic antiemetics administered. Anesthetic plan and risks discussed with patient. Use of blood products discussed with patient whom consented to blood products. Plan discussed with CRNA.     Attending anesthesiologist reviewed and agrees with Steve Garcia MD   7/27/2022

## 2022-07-27 NOTE — BRIEF OP NOTE
Brief Postoperative Note      Patient: Miriam Post  YOB: 1950  MRN: [id number]    Date of Procedure: 7/27/2022    Pre-Op Diagnosis: RIGHT KNEE OSTEOARTHRITIS    Post-Op Diagnosis: Same       Procedure(s):  RIGHT KNEE TOTAL ARTHROPLASTY - Michell Gutierrez    Surgeon(s):  Duke Powell MD    Assistant:  First Assistant: Cecilio Zuniga RN  Resident: Trace Watts DO    Anesthesia: General, regional, local    Estimated Blood Loss (mL): less than 50     TT: 65 min    Complications: None    Specimens:   * No specimens in log *    Implants:  Implant Name Type Inv. Item Serial No.  Lot No. LRB No. Used Action   CEMENT BNE 40GM HI VISC RADPQ FOR REV SURG - [id number]  CEMENT BNE 40GM HI VISC RADPQ FOR REV SURG  [employer] [id number] Right 2 Implanted   COMPONENT PAT [id number] KNEE TI ALLY S STL UHMWPE 3 PE - [id number]  COMPONENT PAT [id number] KNEE TI ALLY S STL UHMWPE 3 PE  [employer]WD [id number] Right 1 Implanted   BEARING TIB AP71MM ML75MM [id number] KNEE ARCM POST STBL MOD - [id number]  BEARING TIB AP71MM ML75MM [id number] KNEE ARCM POST STBL MOD  [employer]WD [id number] Right 1 Implanted   TRAY TIB L75MM KNEE CO CHROM FIN MOD INTLOK J CARLOS VANGUARD - [id number]  TRAY TIB L75MM KNEE CO CHROM FIN MOD INTLOK J CARLOS VANGUARD  [employer]- [id number] Right 1 Implanted   COMPONENT FEM 70MM R KNEE TINBN NP POST STBL OPN BX INTLOK - [id number]  COMPONENT FEM 70MM R KNEE TINBN NP POST STBL OPN BX INTLOK  [employer]WD [id number] Right 1 Implanted         Drains: * No LDAs found *    Findings: See op note.     Electronically signed by Trace Watts DO on 7/27/2022 at 11:24 AM

## 2022-07-27 NOTE — ANESTHESIA POSTPROCEDURE EVALUATION
Department of Anesthesiology  Postprocedure Note    Patient: Suzy Collins  MRN: [id number]  YOB: 1950  Date of evaluation: 7/27/2022      Procedure Summary     Date: 07/27/22 Room / Location: [address] - INPATIENT    Anesthesia Start: 9099 Anesthesia Stop: 1128    Procedure: RIGHT KNEE TOTAL ARTHROPLASTY - [place] (Right: Knee) Diagnosis:       Primary osteoarthritis of right knee      (RIGHT KNEE OA)    Surgeons: Marlee Griffin MD Responsible Provider: Rachel Huddleston MD    Anesthesia Type: general, regional ASA Status: 2          Anesthesia Type: No value filed.     Jalyn Phase I: Jalyn Score: 8    Jalyn Phase II:        Anesthesia Post Evaluation    Patient location during evaluation: PACU  Patient participation: complete - patient participated  Level of consciousness: awake  Airway patency: patent  Nausea & Vomiting: no nausea  Complications: no  Cardiovascular status: blood pressure returned to baseline  Respiratory status: acceptable  Hydration status: euvolemic  Comments: Multimodal analgesia pain management as indicated by procedure  Multimodal analgesia pain management approach

## 2022-07-27 NOTE — H&P
Orthopedic Surgery History and Physical      CC:  Right knee pain    HPI:      The patient is a 66 y.o. male with This a patient with right knee pain and OA. He has elected for TKA for treatment of pain. Past Medical History:  Past Medical History:   Diagnosis Date    Bleeding hemorrhoid     Heartburn     not on medication     History of blood transfusion     realated to bleeding hemrroid, needed 4 units PRBC    Hypertension        Past Surgical History:  Past Surgical History:   Procedure Laterality Date    BAND HEMORRHOIDECTOMY  2018    COLONOSCOPY  02/02/2018    Transanal Colonoscopy with control of bleeding, injection of epinephrine and placement of band x 1.     COLONOSCOPY N/A 2/2/2018    colonoscopy performed by Rudine Gaucher, MD at [address] [place] [address]       Medications Prior to Admission:  Prior to Admission medications    Medication Sig Start Date End Date Taking? Authorizing Provider   acetaminophen (TYLENOL) 325 MG tablet Take 650 mg by mouth every 6 hours as needed for Pain   Yes Historical Provider, MD   Omega-3 Fatty Acids (FISH OIL PO) Take by mouth daily 2 capsules daily   Yes Historical Provider, MD   Red Yeast Rice Extract (RED YEAST RICE PO) Take by mouth daily   Yes Historical Provider, MD   METAMUCIL FIBER PO Take by mouth Daily   Yes Historical Provider, MD   Glucos-Chondroit-Hyaluron-MSM (GLUCOSAMINE CHONDROITIN JOINT PO) Take by mouth Daily   Yes Historical Provider, MD   telmisartan-hydroCHLOROthiazide (MICARDIS HCT) 40-12.5 MG per tablet  4/20/22   Historical Provider, MD       Allergies:  Patient has no known allergies.     Social History:  Social History     Socioeconomic History    Marital status:      Spouse name: None    Number of children: None    Years of education: None    Highest education level: None   Tobacco Use    Smoking status: Former    Smokeless tobacco: Never    Tobacco comments:     quit dec 1979   Substance and Sexual Activity    Alcohol use: Yes     Comment: 3 times a week    Drug use: No       Family History:  History reviewed. No pertinent family history. Review of Systems:   Constitutional: Negative for fever and chills. Musculoskeletal: Positive for joint pain. Physical Exam:  Blood pressure 137/89, pulse 62, temperature 97.5 °F (36.4 °C), temperature source Temporal, resp. rate 16, height 5' 9\" (1.753 m), weight 210 lb 4.8 oz (95.4 kg), SpO2 97 %. Right knee exam shows skin intact, no erythema. Labs:  No results for input(s): WBC, HCT, PLT, INR, PTT, NA, K, BUN, CREATININE, GLUCOSE, SEDRATE, CRP in the last 72 hours. Invalid input(s): PT     Imaging:   Right knee joint space narrowing with osteophyte formation    Assessment:  Right knee OA    Plan:  Plan:  I recommend right TKA for treatment of pain. Risk of infection, persistent pain, repeat surgery, blood clot, stiffness. All questions answered, consent signed.

## 2022-07-27 NOTE — DISCHARGE INSTRUCTIONS
ANY ORTHOPEDIC QUESTIONS OR ANY OTHER CONCERNS YOU MAY CALL THE ORTHOPEDIC COORDINATOR:  Louis Leyva RN, BSN  [phone]  [email or web address]    DISCHARGE INSTRUCTIONS  Caring for yourself after joint replacement surgery (Total Hip and Total Knee Replacement)    Activity and Therapy  Receive physical therapy three times per week. (Pain medication one hour prior to therapy)   Perform PT exercises on own when not receiving home or outpatient PT. Ideally exercises should be at least two times a day. Increase level of activity and ambulation each day. Perform deep breathing exercises daily. Patient provides self-care when possible. Work on Range of motion for Total knee patients. No pillow under the knee for Total knee patients. Elevate the surgical leg when seated. Diet:  Increase oral intake of fruits, fiber and water to prevent constipation. Drink fluids frequently and take stool softeners to aid in bowel motility. Increase protein intake/reduce high-sugar intake to help promote healing and prevent infection. Incision Care:  Keep Aquacel or other dressing intact until seen and removed by surgeon, unless saturated, in which case, call surgeon and request instructions. If dressing falls off, call surgeon. [facility] on in the am and off in the pm to reduce swelling. Ice affected area four times a day, for twenty minutes. Pain Medications and Anticoagulant  You have been place on an anticoagulant to prevent blood clots. Take this medication exactly as prescribed. Be alert for signs of bleeding. Take care not to injure yourself. You have been provided pain medicine to control your pain. Do not take more narcotics than prescribed. You may begin weaning from narcotics as your pain level improves by decreasing the amount or frequency of the narcotics. You may also take plain acetaminophen as an alternate to the narcotics. Never exceed the recommended dosage.    Ice, rest and elevating in a bathtub, pool, or hot tub until your incision is fully closed and any drains are removed. Dont scrub, pick, scratch, or pull at your incision. Dont use oils, lotions, or creams on your incision unless your healthcare provider approves it. Follow-up  You will have one or more follow-up visits with your healthcare provider. These are needed to check how well youre healing. Your drain, stitches, or staples may also be removed during these visits. Do not miss your follow-up visit, even if you are feeling better. Call your healthcare provider right away if you have the following:  Fever of 100.4°F (38°C) or higher, or as advised by your healthcare provider. Chest pain or trouble breathing. Pain or tenderness in your leg(s). Increased pain, redness, swelling, bleeding, or foul-smelling drainage at the incision site. Incision changes, separates or is hot to the touch. Problems with the drain if you have one. Itchy, swollen skin; skin rash.     Medicines, Diet, and Activity:   Refer to your discharge paperwork for further instructions

## 2022-07-27 NOTE — PROGRESS NOTES
Physical Therapy  Facility/Department: [facility] SURG  Physical Therapy Initial Assessment    Name: Dominique Barrera  : 1950  MRN: [id number]  Date of Service: 2022    Discharge Recommendations:  Patient would benefit from continued therapy after discharge    Pt presenting with new musculoskeletal dysfunction and would benefit from additional therapy at time of discharge. Please refer to the AM-PAC score for current functional status. Patient underwent R TKA 22      Patient Diagnosis(es): The encounter diagnosis was S/P TKR (total knee replacement) using cement, right. Past Medical History:  has a past medical history of Bleeding hemorrhoid, Heartburn, History of blood transfusion, and Hypertension. Past Surgical History:  has a past surgical history that includes Colonoscopy (2018); Colonoscopy (N/A, 2018); Band hemorrhoidectomy (); and Total knee arthroplasty (Right, 2022). Assessment   Body Structures, Functions, Activity Limitations Requiring Skilled Therapeutic Intervention: Decreased functional mobility ; Decreased strength;Decreased ROM; Decreased endurance;Decreased balance  Assessment: Skilled therapy needed to maximize independence with functional mobility. Patient is s/p R TKA and would benefit from further therapy following discharge.   Therapy Prognosis: Good  Decision Making: Medium Complexity  Exam: AROM, strength, endurance, mobility, AM-PAC, balance  Requires PT Follow-Up: Yes  Activity Tolerance  Activity Tolerance: Patient tolerated treatment well     Plan   Plan  Plan: 2 times a day 7 days a week  Current Treatment Recommendations: Strengthening, Transfer training, Gait training, Stair training, Endurance training, Home exercise program, Safety education & training, Patient/Caregiver education & training, Positioning, Therapeutic activities  Safety Devices  Type of Devices: Call light within reach, Gait belt, Left in chair, Nurse notified Restrictions  Restrictions/Precautions  Restrictions/Precautions: Weight Bearing, General Precautions, Surgical Protocols  Lower Extremity Weight Bearing Restrictions  Right Lower Extremity Weight Bearing: Weight Bearing As Tolerated  Partial Weight Bearing Percentage Or Pounds: Full weight-bearing     Subjective   General  Chart Reviewed: Yes  Patient assessed for rehabilitation services?: Yes  Family / Caregiver Present: Yes (wife present)  General Comment  Comments: RN states patient appropriate for therapy  Subjective  Subjective: ache R Knee, very pleasant and agreeable to work with therapy         Social/Functional History  Social/Functional History  Lives With: Spouse  Type of Home: House  Home Layout: Two level, Able to Live on Main level with bedroom/bathroom, 1/2 bath on main level, Bed/Bath upstairs (flight with HR)  Home Access: Stairs to enter with rails  Entrance Stairs - Number of Steps: 1+1 platform  Bathroom Shower/Tub: Walk-in shower  Bathroom Equipment: Built-in shower seat  Home Equipment: [employer] Help From: Family  ADL Assistance: Independent  Homemaking Assistance: Independent (share with wife)  Ambulation Assistance: Independent  Transfer Assistance: Independent  Active : Yes  Occupation: Part time employment  Type of Occupation:  at [address] [place] [address] Rd: [email or web address]  Additional Comments: Supportive wife will be home 24/7 for several days upon return home  Vision/Hearing  Vision  Vision: Within Functional Limits  Hearing  Hearing: Within functional limits    Cognition         Objective   Heart Rate: 88  [address]: Monitor  BP: 122/80  BP Location: Left upper arm  BP Method: Automatic  Patient Position: Semi fowlers  MAP (Calculated): 94  Resp: 16  SpO2: 95 %  O2 Device: None (Room air)     Observation/Palpation  Posture: Good  Observation: resting in bed eating lunch        AROM RLE (degrees)  RLE AROM: WFL  RLE General AROM: near full knee extension and 90 degree active flexion  AROM LLE (degrees)  LLE AROM : WFL  AROM RUE (degrees)  RUE General AROM: Refer to OT assessment  AROM LUE (degrees)  LUE General AROM: Refer to OT assessment  Strength RLE  Comment: able to lift against gravity, good quad contraction  Strength LLE  Strength LLE: WFL  Strength RUE  Comment: refer to OT assessment  Strength LUE  Comment: Refer to OT assessment     Sensation  Overall Sensation Status: Impaired (diminished sensation medial L calf)  Balance  Sitting: Intact  Standing: High guard (CGA with RW)  Gait  Overall Level of Assistance: Contact-guard assistance;Assist X1 (Engaged pt in functional mobility at RW level with CGA x1 from EOB and made a [place] in the room. Pt then rested and engaged pt in stair training utilizing platform.  Pt went up and down platform 2x with good adherance to \"up with good, down with bad\" .)  Interventions: Safety awareness training;Verbal cues (Provided min verbal cues for RW safety/management, staying with RW, AD/RLE/LLE sequence, and enviornmental scanning to increase safety with task.)  Assistive Device: Walker, rolling  Bed mobility  Supine to Sit: Stand by assistance  Scooting: Stand by assistance  Bed Mobility Comments: verbal cues needed for correct technique, slightly light headed when first sitting but quickly improved  Transfers  Sit to Stand: Minimal Assistance;Contact guard assistance  Stand to sit: Minimal Assistance;Contact guard assistance  Comment: cues for correct hand placement, increased assist initially due to diminished sensation R LE, stood at RW and performed weight shifting and marching and no instability observed  Ambulation  WB Status: R WBAT  Ambulation  Surface: level tile  Device: Rolling Walker  Assistance: Minimal assistance;Contact guard assistance  Gait Deviations: Decreased step length  Distance: 40 feet  Comments: patient needed cues to slow down, anxious to be moving fast, cues for step to pattern, assist decreased to CGA. Stairs/Curb  Stairs?: Yes  Stairs  # Steps : 2 (platform)  Device: Rolling walker  Assistance: Minimal assistance  Comment: patient had good recall of technique, assist with walker placement and to steady patient     Balance  Sitting - Static: Good  Sitting - Dynamic: Good  Standing - Static: Fair;+  Standing - Dynamic: Fair  Exercise Treatment: given HEP consisting of AP, GS, QS, and heel slides          AM-PAC Score  AM-PAC Inpatient Mobility Raw Score : 20 (07/27/22 1520)  AM-PAC Inpatient T-Scale Score : 47.67 (07/27/22 1520)  Mobility Inpatient CMS 0-100% Score: 35.83 (07/27/22 1520)  Mobility Inpatient CMS G-Code Modifier : CJ (07/27/22 1520)               Goals  Short Term Goals  Time Frame for Short term goals: 4 visits  Short term goal 1: Independent transfers  Short term goal 2:  Independent ambulation 100 feet with RW  Short term goal 3: Patient to demonstrate independence with HEP  Patient Goals   Patient goals : to go home       Education   Role of Therapy; Plan of Care; Home Exercise Program; Safety; Transfer Training; Mobility Training      Therapy Time   Individual Concurrent Group Co-treatment   Time In [phone]         Time Out 1450         Minutes 62          Treatment time: 30 minutes       Elayne Wallace, PT

## 2022-07-27 NOTE — PROGRESS NOTES
Patient connected to all monitoring equipment. Patient placed on nasal cannula at 2 liters. Dr. Genna Davila explained in depth the purpose of the PNB. Time Out completed at 0845 with 2 RN's and Dr. Genna Davila at bedside. Versed 1 mg  IV administered at 0846. Procedure begin time at 0850. Procedure end time at 0852. All safety measures implemented. SR up x2. Bed in lowest locked position. HOB elevated. Call light within reach. Wife at bedside.

## 2022-07-27 NOTE — PROGRESS NOTES
Radha Chávez was evaluated today and a DME order was entered for a wheeled walker because he requires this to successfully complete daily living tasks of ambulating. A wheeled walker is necessary due to the patient's unsteady gait, upper body weakness, and inability to  an ambulation device; and he can ambulate only by pushing a walker instead of a lesser assistive device such as a cane, crutch, or standard walker. The need for this equipment was discussed with the patient and he understands and is in agreement.

## 2022-07-27 NOTE — PROGRESS NOTES
Pt admitted to room per bed in good condition from PACU. Oriented to room and surroundings  Bed in lowest position, wheels locked, 2/4 side rails up  Call light in reach, room free of clutter, adequate lighting provided  Denies any further questions at this time  Instructed to call out with any questions/concerns/new onset of pain and/or n/v   White board updated  Continue to monitor with hourly rounding  Bed alarm on/Fall Risk signs in place/Fall risk sticker to wrist band  Non-skid socks on/at bedside  [address] in place for patient/family to view & ask questions.

## 2022-07-27 NOTE — PROGRESS NOTES
Orthopedic Coordinator Note    Patient s/p right total Knee replacement on 07/27/2022 WITH DR. Jennifer Sandra. The following appointments are currently scheduled:    Post-op with surgeon 08/09/2022 AT [phone], PT TO ARRIVE EARLY FOR XRAY. Physical Therapy UNSURE      Wheeled walker order is  entered  Face to face documentation is ENTERED. LEFT PT A VM 07/26/2022 FOR NEED FOR WALKER AND HHC POST-OP?       Any questions please contact Marva Becerra RN, BSN      Electronically signed by: Marva Becerra RN on 7/27/2022 at 9:27 AM

## 2022-07-27 NOTE — ANESTHESIA PROCEDURE NOTES
Peripheral Block    Patient location during procedure: pre-op  Reason for block: procedure for pain, post-op pain management and at surgeon's request  Start time: 7/27/2022 8:45 AM  End time: 7/27/2022 8:52 AM  Staffing  Performed: anesthesiologist   Anesthesiologist: Justyna Schneider MD  Preanesthetic Checklist  Completed: patient identified, IV checked, site marked, risks and benefits discussed, surgical/procedural consents, equipment checked, pre-op evaluation, timeout performed, anesthesia consent given, oxygen available, monitors applied/VS acknowledged, fire risk safety assessment completed and verbalized and blood product R/B/A discussed and consented  Peripheral Block   Patient position: supine  Prep: ChloraPrep  Provider prep: sterile gloves and mask  Patient monitoring: continuous capnometry, continuous pulse ox, cardiac monitor, frequent blood pressure checks, IV access, oxygen and responsive to questions  Block type: Femoral  Adductor canal  Laterality: right  Injection technique: single-shot  Guidance: ultrasound guided  Local infiltration: lidocaine  Infiltration strength: 1 %  Local infiltration: lidocaine  Dose: 2 mL    Needle   Needle type: pencil-tip   Needle gauge: 20 G  Needle localization: ultrasound guidance  Assessment   Injection assessment: negative aspiration for heme, no paresthesia on injection, local visualized surrounding nerve on ultrasound and no intravascular symptoms  Paresthesia pain: none  Slow fractionated injection: yes  Hemodynamics: stable  Real-time US image taken/store: yes  Outcomes: patient tolerated procedure well and uncomplicated    Additional Notes  Preoprocedure ready time 0845  Medications Administered  bupivacaine (PF) 0.25 % - Perineural, Leg Right   20 mL - 7/27/2022 8:45:00 AM  bupivacaine liposome 1.3 % - Perineural, Leg Right   10 mL - 7/27/2022 8:45:00 AM

## 2022-07-27 NOTE — PROGRESS NOTES
Mina Israel was evaluated today and a DME order was entered for a wheeled walker because he requires this to successfully complete daily living tasks of ambulating. A wheeled walker is necessary due to the patient's unsteady gait, upper body weakness, and inability to  an ambulation device; and he can ambulate only by pushing a walker instead of a lesser assistive device such as a cane, crutch, or standard walker. The need for this equipment was discussed with the patient and he understands and is in agreement.

## 2022-07-27 NOTE — H&P
Interval H&P Note    Pt Name: Seda Mcdermott  MRN: [id number]  YOB: 1950  Date of evaluation: 7/27/2022      [x] I have reviewed in epic the H&P by Keegan LEON-CNP dated 7/7/2022 for an Interval History and Physical note. [x] I have examined  Seda Mcdermott  There are no changes to the patient who is scheduled for RIGHT KNEE TOTAL ARTHROPLASTY - Davina Aragon by Alice Girard MD for RIGHT KNEE OA. The patient denies new health changes, fever, chills, wheezing, cough, increased SOB, chest pain, open sores or wounds. Denies hx diabetes. Denies taking any blood thinning medications in the last 10 days. Vital signs: BP (!) 141/98   Pulse 63   Temp 97.3 °F (36.3 °C) (Temporal)   Resp 18   Ht 5' 8\" (1.727 m)   Wt 208 lb (94.3 kg)   SpO2 97%   BMI 31.63 kg/m²     Allergies:  Patient has no known allergies. Medications:    Prior to Admission medications    Medication Sig Start Date End Date Taking? Authorizing Provider   telmisartan-hydroCHLOROthiazide (MICARDIS HCT) 40-12.5 MG per tablet  4/20/22   Historical Provider, MD   acetaminophen (TYLENOL) 325 MG tablet Take 650 mg by mouth every 6 hours as needed for Pain    Historical Provider, MD   Omega-3 Fatty Acids (FISH OIL PO) Take by mouth daily 2 capsules daily    Historical Provider, MD   Red Yeast Rice Extract (RED YEAST RICE PO) Take by mouth daily    Historical Provider, MD   METAMUCIL FIBER PO Take by mouth Daily    Historical Provider, MD   Glucos-Chondroit-Hyaluron-MSM (GLUCOSAMINE CHONDROITIN JOINT PO) Take by mouth Daily    Historical Provider, MD         This is a 66 y.o. male who is pleasant, cooperative, alert and oriented x3, in no acute distress. Heart: Heart sounds are normal.  HR 63 regular rate and rhythm without murmur, gallop or rub.    Lungs: Normal respiratory effort with equal expansion, good air exchange, unlabored and clear to auscultation without wheezes or rales bilaterally   Abdomen: soft, nontender, nondistended with bowel sounds . Labs:  Recent Labs     07/07/22  0952   HGB 14.3   HCT 42.0   WBC 5.2   MCV 95.0      BUN 18   CREATININE 1.09       No results for input(s): COVID19 in the last 720 hours. EDWARD Mcfadden CNP  Electronically signed 7/27/2022 at [address], APRN - CNP   Nurse Practitioner   General Surgery   H&P       Cosign Needed   Date of Service:  7/7/2022  9:30 AM          Related encounter: Pre-Admission Testing Visit 30 min from 7/7/2022 in [facility] PRE[facility]ADMIT TEST[facility]           Cosign Needed            History and Physical Service   [address] [facility]            Date of Evaluation:     7/7/2022  Patient name:              Jenn Jones  MRN:                           [id number]  YOB: 1950  PCP:                            Augustine Rob MD     History Obtained From:      Patient, medical records     History of Present Illness: This is Jenn Jones a 66 y.o. male who presents for a pre-admission testing appointment for an upcoming [address] [place] [address] by Marilee Love MD scheduled on 7/27/2022 @ 1000 due to RIGHT KNEE OA. The patient's chief complaint is 4-5 /10 RIGHT KNEE  UP TO 7/10 WHEN WALKING. THE pain which has progressively worsened over the past Patricia Deniz. PROGRESSIVELY WORSE. .  THE PAIN IS AGGRAVATED BY  WALKING A DISTANCE, PIVOTING , OR WALKING ON AN UNEVEN SURFACE.  and is minimally relieved with TYLENOL AND A BRACE. Artemio Reyes Prior treatment includes NONE . Denies recent falls and injuries. HIS KNEE IS UNSTABLE. HE PRESENTS FOR SURGICAL CORRECTION. Sleep apnea questionnaire  1) Do you snore loudly? YES   2) Do you often feel tired, fatigued, or sleepy? YES   3) Has anyone observed you stop breathing or choking/gasping during your sleep? NO   4) Do you have hypertension? YES   5) BMI >35 kg/m2? NO  6) Age > 50? YES   7) Pt is a male? YES      Functional Capacity:              1) Pt IS able to walk 2 city blocks on level ground without SOB. 2) Pt IS able to climb 2 flights of stairs without SOB. 3) Pt IS able to walk up a hill for 1-2 city blocks without SOB. Past Medical History:      Past Medical History        Past Medical History:   Diagnosis Date    Bleeding hemorrhoid      Heartburn       not on medication    History of blood transfusion       realated to bleeding hemrroid, needed 4 units PRBC    Hypertension              Past Surgical History:      Past Surgical History         Past Surgical History:   Procedure Laterality Date    BAND HEMORRHOIDECTOMY   2018    COLONOSCOPY   02/02/2018     Transanal Colonoscopy with control of bleeding, injection of epinephrine and placement of band x 1.    COLONOSCOPY N/A 2/2/2018     colonoscopy performed by Silvestre Lema MD at [address] [place] [address]            Medications Prior to Admission:      Home Medications           Prior to Admission medications   Medication Sig Start Date End Date Taking? Authorizing Provider   acetaminophen (TYLENOL) 325 MG tablet Take 650 mg by mouth every 6 hours as needed for Pain     Yes Historical Provider, MD   Omega-3 Fatty Acids (FISH OIL PO) Take by mouth daily 2 capsules daily     Yes Historical Provider, MD   Red Yeast Rice Extract (RED YEAST RICE PO) Take by mouth daily     Yes Historical Provider, MD   METAMUCIL FIBER PO Take by mouth Daily     Yes Historical Provider, MD   Glucos-Chondroit-Hyaluron-MSM (GLUCOSAMINE CHONDROITIN JOINT PO) Take by mouth Daily     Yes Historical Provider, MD   telmisartan-hydroCHLOROthiazide (MICARDIS HCT) 40-12.5 MG per tablet   4/20/22     Historical Provider, MD            Allergies:      Patient has no known allergies. Social History:      Tobacco:    reports that he has quit smoking. He has never used smokeless tobacco.  Alcohol:      reports current alcohol use.   Drug Use:  reports no history of drug use.     Family History:      FATHER  NATURALLY AT 80, MOTHER  AT 80 OF COMPLICATIONS AFTER SURGERY     Review of Systems:      Positive and Negative as described in HPI. CONSTITUTIONAL:  Negative for fevers, chills, sweats, fatigue, and weight loss. HEENT:  Negative for glasses, hearing changes, rhinorrhea, and throat pain. RESPIRATORY:  Negative for shortness of breath, cough, congestion, and wheezing. CARDIOVASCULAR: HAS HYPERTENSION , Negative for chest pain, blood clot, irregular heartbeat, and palpitations. GASTROINTESTINAL: HAS GERD  Negative for reflux, nausea, vomiting, diarrhea, constipation, change in bowel habits, and abdominal pain. GENITOURINARY:  Negative for difficulty of urination, burning with urination, and frequency. INTEGUMENT:  Negative for rash, skin lesions, and easy bruising. Instructed pt to call Dr. Ac Garcia  as soon as possible if a rash or wound develops prior to surgery. Pt voiced understanding. HEMATOLOGIC/LYMPHATIC:  Negative for swelling/edema. ALLERGIC/IMMUNOLOGIC:  Negative for urticaria and itching. ENDOCRINE:  Negative for increase in thirst, increase in urination, and heat or cold intolerance. MUSCULOSKELETAL: See HPI. NEUROLOGICAL:  Negative for headaches, dizziness, lightheadedness, numbness, and tingling extremities. BEHAVIOR/PSYCH:  Negative for depression and anxiety. Physical Exam:   /89   Pulse 62   Temp 97.5 °F (36.4 °C) (Temporal)   Resp 16   Ht 5' 9\" (1.753 m)   Wt 210 lb 4.8 oz (95.4 kg)   SpO2 97%   BMI 31.06 kg/m²   No results for input(s): POCGLU in the last 72 hours. General Appearance:  Alert, well appearing, and in no acute distress. Mental status:  Oriented to person, place, and time. Head:  Normocephalic and atraumatic. Eye:  No icterus, redness, pupils equal and reactive, extraocular eye movements intact, and conjunctiva clear. Ear:  Hearing grossly intact. Nose:  No drainage noted.   Mouth:  Mucous membranes moist.  Neck:  Supple and no carotid bruits noted. Lungs:  Bilateral equal air entry, clear to auscultation, no wheezing, rales or rhonchi, and normal effort. Cardiovascular:  Normal rate, regular rhythm, no murmur, gallop, or rub. Abdomen:  Soft, non-tender, non-distended, and active bowel sounds. Neurologic:  Normal speech and cranial nerves II through XII grossly intact. Strength 5/5 bilaterally. Skin:  No gross lesions, rashes, bruising, or bleeding on exposed skin area. Extremities:  Posterior tibial pulses 2+ bilaterally. No pedal edema. No calf tenderness with palpation. Psych:  Normal affect. Investigations:      Laboratory Testing:  Recent Results         Recent Results (from the past 24 hour(s))   CBC     Collection Time: 07/07/22  9:52 AM   Result Value Ref Range     WBC 5.2 3.5 - 11.3 k/uL     RBC 4.42 4.21 - 5.77 m/uL     Hemoglobin 14.3 13.0 - 17.0 g/dL     Hematocrit 42.0 40.7 - 50.3 %     MCV 95.0 82.6 - 102.9 fL     MCH 32.4 25.2 - 33.5 pg     MCHC 34.0 28.4 - 34.8 g/dL     RDW 12.4 11.8 - 14.4 %     Platelets 924 157 - 588 k/uL     MPV 10.0 8.1 - 13.5 fL     NRBC Automated 0.0 0.0 per 100 WBC   BUN & Creatinine     Collection Time: 07/07/22  9:52 AM   Result Value Ref Range     BUN 18 8 - 23 mg/dL     CREATININE 1.09 0.70 - 1.20 mg/dL     GFR Non-African American >60 >60 mL/min     GFR African American >60 >60 mL/min     GFR Comment          EKG 12 Lead     Collection Time: 07/07/22 10:12 AM   Result Value Ref Range     Ventricular Rate 58 BPM     Atrial Rate 58 BPM     P-R Interval 212 ms     QRS Duration 92 ms     Q-T Interval 434 ms     QTc Calculation (Bazett) 426 ms     P Axis 30 degrees     R Axis -49 degrees                Recent Labs     07/07/22  0952   HGB 14.3   HCT 42.0   WBC 5.2   MCV 95.0   BUN 18   CREATININE 1.09         No results for input(s): COVID19 in the last 720 hours.      *Please note that labs listed above are the most recent lab values available in [facility] at the time of the visit and additional labs may have been drawn or resulted since that time. Imaging/Diagnostics:        No results found. EKG: OF 7/7/2022 IS ON THE SHORT CHART See Epic. Diagnosis:      1. RIGHT KNEE OA. Plans:      1.  RIGHT KNEE TOTAL ARTHROPLASTY - [address]        EDWARD Vuong - CNP  7/7/2022  10:55 AM                     Routing History

## 2022-07-27 NOTE — PROGRESS NOTES
Pt discharged to home in good condition with belongings  Discharge instructions given  Pt denies having any further questions at this time  Locked up home medication(s)/personal items given to patient at discharge  Patient/family state they have everything they were admitted with.   Dressing change supplies and hibiclens sent home with patient

## 2022-07-28 ENCOUNTER — HOSPITAL ENCOUNTER (EMERGENCY)
Age: 72
Discharge: HOME | End: 2022-07-29
Attending: EMERGENCY MEDICINE
Payer: MEDICARE

## 2022-07-28 VITALS
DIASTOLIC BLOOD PRESSURE: 80 MMHG | RESPIRATION RATE: 18 BRPM | TEMPERATURE: 98.9 F | HEART RATE: 97 BPM | BODY MASS INDEX: 31.52 KG/M2 | SYSTOLIC BLOOD PRESSURE: 114 MMHG | OXYGEN SATURATION: 97 % | WEIGHT: 208 LBS | HEIGHT: 68 IN

## 2022-07-28 DIAGNOSIS — G89.18 POST-OPERATIVE PAIN: Primary | ICD-10-CM

## 2022-07-28 PROCEDURE — 99282 EMERGENCY DEPT VISIT SF MDM: CPT

## 2022-07-29 ASSESSMENT — ENCOUNTER SYMPTOMS
SORE THROAT: 0
RHINORRHEA: 0
VOMITING: 0
NAUSEA: 0
SHORTNESS OF BREATH: 0
EYE REDNESS: 0
COUGH: 0
DIARRHEA: 0
COLOR CHANGE: 0
EYE DISCHARGE: 0

## 2022-07-29 NOTE — ED PROVIDER NOTES
EMERGENCY DEPARTMENT ENCOUNTER    Pt Name: Mina Israel  MRN: [id number]  [place] 1950  Date of evaluation: 7/28/22  CHIEF COMPLAINT       Chief Complaint   Patient presents with    Leg Swelling     R; had knee replacement surgery yesterday    Numbness     R leg     HISTORY OF PRESENT ILLNESS   66year-old presents with complaints of pain and swelling in the right knee. Patient also has some numbness and tingling in the medial aspect of the right leg. Patient had a total knee replacement performed yesterday with a block, he was told to come back to the hospital if he developed worsening swelling numbness tingling or pain. Patient states his pain is much worse today, associated with significant swelling and numbness and tingling to the medial aspect of the right leg. Patient describes his pain symptoms as severe, without any specific aggravating or alleviating factors. Denies any nausea or vomiting. REVIEW OF SYSTEMS     Review of Systems   Constitutional:  Negative for chills and fever. HENT:  Negative for rhinorrhea and sore throat. Eyes:  Negative for discharge, redness and visual disturbance. Respiratory:  Negative for cough and shortness of breath. Cardiovascular:  Negative for chest pain, palpitations and leg swelling. Gastrointestinal:  Negative for diarrhea, nausea and vomiting. Musculoskeletal:  Negative for arthralgias, myalgias and neck pain. Right knee pain and swelling   Skin:  Negative for color change and rash. Neurological:  Negative for seizures, weakness and headaches. Psychiatric/Behavioral:  Negative for hallucinations, self-injury and suicidal ideas.     PASTMEDICAL HISTORY     Past Medical History:   Diagnosis Date    Bleeding hemorrhoid     Heartburn     not on medication     History of blood transfusion     realated to bleeding hemrroid, needed 4 units PRBC    Hypertension      Past Problem List  Patient Active Problem List   Diagnosis Code Gastrointestinal hemorrhage associated with anorectal source K62.5    Hypotension I95.9    Acute blood loss anemia D62    Vasovagal syncope R55    Hypotension I95.9    Lower GI bleed K92.2    S/P total knee arthroplasty, right Z96.651     SURGICAL HISTORY       Past Surgical History:   Procedure Laterality Date    BAND HEMORRHOIDECTOMY  2018    COLONOSCOPY  02/02/2018    Transanal Colonoscopy with control of bleeding, injection of epinephrine and placement of band x 1.     COLONOSCOPY N/A 2/2/2018    colonoscopy performed by Vivien Lawrence MD at [facility] Right 7/27/2022    RIGHT KNEE TOTAL ARTHROPLASTY - Derrel Mew performed by Soledad Vera MD at [address] [place] [address]       Current Discharge Medication List        CONTINUE these medications which have NOT CHANGED    Details   oxyCODONE-acetaminophen (PERCOCET) 5-325 MG per tablet Take 1 tablet by mouth every 6 hours as needed for Pain for up to 7 days. Intended supply: 7 days. Take lowest dose possible to manage pain  Qty: 28 tablet, Refills: 0    Comments: Reduce doses taken as pain becomes manageable  Associated Diagnoses: S/P TKR (total knee replacement) using cement, right      cephALEXin (KEFLEX) 500 MG capsule Take 1 capsule by mouth in the morning and 1 capsule at noon and 1 capsule in the evening and 1 capsule before bedtime. Do all this for 2 days. Qty: 8 capsule, Refills: 0      aspirin 325 MG EC tablet Take 1 tablet by mouth in the morning and 1 tablet before bedtime.   Qty: 70 tablet, Refills: 0      telmisartan-hydroCHLOROthiazide (MICARDIS HCT) 40-12.5 MG per tablet       acetaminophen (TYLENOL) 325 MG tablet Take 650 mg by mouth every 6 hours as needed for Pain      Omega-3 Fatty Acids (FISH OIL PO) Take by mouth daily 2 capsules daily      Red Yeast Rice Extract (RED YEAST RICE PO) Take by mouth daily      METAMUCIL FIBER PO Take by mouth Daily      Glucos-Chondroit-Hyaluron-MSM (GLUCOSAMINE CHONDROITIN JOINT PO) Take by mouth Daily           ALLERGIES     has No Known Allergies. FAMILY HISTORY     has no family status information on file. SOCIAL HISTORY       Social History     Tobacco Use    Smoking status: Former    Smokeless tobacco: Never    Tobacco comments:     quit dec 1979   Substance Use Topics    Alcohol use: Yes     Comment: 3 times a week    Drug use: No     PHYSICAL EXAM     INITIAL VITALS: /80   Pulse 97   Temp 98.9 °F (37.2 °C) (Oral)   Resp 18   Ht 5' 8\" (1.727 m)   Wt 208 lb (94.3 kg)   SpO2 97%   BMI 31.63 kg/m²    Physical Exam  Constitutional:       Appearance: Normal appearance. He is well-developed. He is not ill-appearing or toxic-appearing. HENT:      Head: Normocephalic and atraumatic. Eyes:      Conjunctiva/sclera: Conjunctivae normal.      Pupils: Pupils are equal, round, and reactive to light. Neck:      Trachea: Trachea normal.   Cardiovascular:      Rate and Rhythm: Normal rate and regular rhythm. Heart sounds: S1 normal and S2 normal. No murmur heard. Pulmonary:      Effort: Pulmonary effort is normal. No accessory muscle usage or respiratory distress. Breath sounds: Normal breath sounds. Chest:      Chest wall: No deformity or tenderness. Abdominal:      General: Bowel sounds are normal. There is no distension or abdominal bruit. Palpations: Abdomen is not rigid. Tenderness: There is no abdominal tenderness. There is no guarding or rebound. Musculoskeletal:      Cervical back: Normal range of motion and neck supple. Legs:    Skin:     General: Skin is warm. Findings: No rash. Neurological:      Mental Status: He is alert and oriented to person, place, and time. GCS: GCS eye subscore is 4. GCS verbal subscore is 5. GCS motor subscore is 6.    Psychiatric:         Speech: Speech normal.       MEDICAL DECISION MAKIN-year-old male, postoperative knee pain and swelling, this appears to me to be normal postoperative swelling and pain, case will be discussed with the on-call orthopedic surgeon. 1:00 AM EDT  I was unable to get a hold of the patient's orthopedic surgeon, plan is discharged with outpatient follow-up, return if symptoms worsen or change. CRITICAL CARE:       PROCEDURES:    Procedures    DIAGNOSTIC RESULTS   EKG:All EKG's are interpreted by the Emergency Department Physician who either signs or Co-signs this chart in the absence of a cardiologist.        RADIOLOGY:All plain film, CT, MRI, and formal ultrasound images (except ED bedside ultrasound) are read by the radiologist, see reports below, unless otherwisenoted in MDM or here. No orders to display     LABS: All lab results were reviewed by myself, and all abnormals are listed below. Labs Reviewed - No data to display    EMERGENCY DEPARTMENTCOURSE:         Vitals:    Vitals:    07/28/22 2322 07/28/22 2324   BP:  114/80   Pulse: 97    Resp: 18    Temp: 98.9 °F (37.2 °C)    TempSrc: Oral    SpO2: 97%    Weight: 208 lb (94.3 kg)    Height: 5' 8\" (1.727 m)        The patient was given the following medications while in the emergency department:  No orders of the defined types were placed in this encounter. CONSULTS:  IP CONSULT TO ORTHOPEDIC SURGERY    FINAL IMPRESSION      1. Post-operative pain          DISPOSITION/PLAN   DISPOSITION Decision To Discharge 07/29/2022 12:59:38 AM      PATIENT REFERRED TO:  Leonela Kumar MD  [address] [place] [address] Claudia Mathias Moritz 723 [phone]    Call today    DISCHARGE MEDICATIONS:  Current Discharge Medication List        The care is provided during an unprecedented national emergency due to the novel coronavirus, COVID 19.   MD Ruth Avila MD  07/29/22 0100

## 2023-07-26 NOTE — PROGRESS NOTES
Occupational Therapy  Facility/Department: [facility] MED SURG  Occupational Therapy Initial Assessment    Name: Mak Adams  : 1950  MRN: [id number]  Date of Service: 2022    RN Casa Velazquez reports patient is medically stable for therapy treatment this date. Chart reviewed prior to treatment and patient is agreeable for therapy. All lines intact and patient positioned comfortably at end of treatment. All patient needs addressed prior to ending therapy session. Discharge Recommendations: Other (comment) (Home with assist)  OT Equipment Recommendations  Equipment Needed: Yes  Mobility Devices: May Riedel: Minal Barnes 894 with assist with plan to attend outpatient PT. S/P R TKA  Patient Diagnosis(es): The encounter diagnosis was S/P TKR (total knee replacement) using cement, right. Past Medical History:  has a past medical history of Bleeding hemorrhoid, Heartburn, History of blood transfusion, and Hypertension. Past Surgical History:  has a past surgical history that includes Colonoscopy (2018); Colonoscopy (N/A, 2018); Band hemorrhoidectomy (); and Total knee arthroplasty (Right, 2022). Assessment   Assessment: Pt S/P R TKA and is safe to go home with support on this date. Pt states plans for outpatient PT to continue rehabilitation of R knee. Pt and pt's spouse receptive to all education with good demo/verbalization of understanding.   Prognosis: Good  Decision Making: Medium Complexity  REQUIRES OT FOLLOW-UP: Yes  Activity Tolerance  Activity Tolerance: Patient Tolerated treatment well  Activity Tolerance Comments: Pt did take one rest break following approx 4 min of functional mobility/standing d/t fatigue        Plan   Plan  Times per Week: 5-6x/week; 1-2x/day as tolerated  Current Treatment Recommendations: Strengthening, Balance training, Functional mobility training, Endurance training, Neuromuscular re-education, Pain management, Safety education & training, Patient/Caregiver education & training, Equipment evaluation, education, & procurement, Positioning, Self-Care / ADL, Home management training, Sensory integraion     Restrictions  Restrictions/Precautions  Restrictions/Precautions: Weight Bearing, General Precautions, Surgical Protocols  Lower Extremity Weight Bearing Restrictions  Right Lower Extremity Weight Bearing: Weight Bearing As Tolerated  Partial Weight Bearing Percentage Or Pounds: Full weight-bearing    Subjective   General  Chart Reviewed: Yes  Patient assessed for rehabilitation services?: Yes  Family / Caregiver Present: Yes (Pt's spouse)  Subjective  Subjective: Pt pleasant, stated that he was a little groggy following surgery but was feeling better now.      Social/Functional History  Social/Functional History  Lives With: Spouse  Type of Home: House  Home Layout: Two level, Able to Live on Main level with bedroom/bathroom, 1/2 bath on main level, Bed/Bath upstairs (flight with HR)  Home Access: Stairs to enter with rails  Entrance Stairs - Number of Steps: 1+1 platform  Bathroom Shower/Tub: Walk-in shower  Bathroom Equipment: Built-in shower seat  Home Equipment: [facility] Help From: Family  ADL Assistance: Independent  Homemaking Assistance: Independent (share with wife)  Ambulation Assistance: Independent  Transfer Assistance: Independent  Active : Yes  Occupation: Part time employment  Type of Occupation:  at [address] [place] [address] Rd: golf, garden  Additional Comments: Supportive wife will be home 24/7 for several days upon return home       Objective   Heart Rate: 88  Heart Rate Source: Monitor  BP: 122/80  BP Location: Left upper arm  BP Method: Automatic  Patient Position: Semi fowlers  MAP (Calculated): 94  Resp: 16  SpO2: 95 %  O2 Device: None (Room air)          Observation/Palpation  Posture: Good  Observation: resting in bed eating lunch  Edema: mild R LE  Safety Devices  Type of Devices: Call light within reach;Gait belt;Left in chair;Nurse notified  Balance  Sitting: Intact  Standing: High guard (CGA with RW)  Gait  Overall Level of Assistance: Contact-guard assistance;Assist X1 (Engaged pt in functional mobility at RW level with CGA x1 from EOB and made a Augustine in the room. Pt then rested in chair for approx 4 mins. Then engaged pt in stair training utilizing platform. Pt went up and down platform 2x with good adherance to \"up with good, down with bad\" tech.)  Interventions: Safety awareness training;Verbal cues (Provided min verbal cues for RW safety/management, staying with RW, AD/RLE/LLE sequence, and enviornmental scanning to increase safety with task.) Pt with excellent return and follow through on instruction. Very eager to learn and do things \"right\"  Assistive Device: Walker, rolling     AROM: Within functional limits  Strength: Within functional limits (BUE: 4+/5)  Coordination: Within functional limits  Tone: Normal  Sensation: Impaired (Pt notes diminished sensation at R medial calf)  ADL  Feeding: Independent  Grooming: Setup  UE Bathing: Setup  LE Bathing: Minimal assistance; Moderate assistance  UE Dressing: Setup  LE Dressing: Maximum assistance; Moderate assistance  LE Dressing Skilled Clinical Factors: Max Assist for donning R Michael Hose and  sock. Toileting: Minimal assistance        Bed mobility  Rolling to Right: Stand by assistance  Supine to Sit: Stand by assistance  Scooting: Stand by assistance  Bed Mobility Comments: Pt SBA for all bed mobility including rolling to the R and pushing self upright. One verbal cue provided for L hand placement on bedrail. Pt did not some dizzyness when sitting upright at EOB, but it resolved within several minutes.   Transfers  Sit to stand: Contact guard assistance;Minimal assistance  Stand to sit: Contact guard assistance;Minimal assistance  Transfer Comments: Pt Min Ax1 for STS transfer from the EOB during initial standing following sx, but improved to CGA to and from chair with armrests throughout the session. Provided mod verbal cues for B hand placement on surface prior to standing and reaching back prior to sitting; squaring self/AD up to surface prior to sitting; feeling surface on B LEs prior to sitting; keeping RW close; and environmental scanning to increase safety with task. Vision  Vision: Within Functional Limits  Hearing  Hearing: Within functional limits  Cognition  Overall Cognitive Status: WNL  Orientation  Overall Orientation Status: Within Normal Limits  Orientation Level: Oriented X4  Perception  Overall Perceptual Status: WFL     Sensation  Overall Sensation Status: Impaired (diminished sensation medial L calf)         Education Given To: Patient; Family  Education Provided: Role of Therapy;Plan of Care;Home Exercise Program;Precautions; ADL Adaptive Strategies;Transfer Training;Energy Conservation;IADL Safety; Family Education;Equipment; Fall Prevention Strategies  Education Provided Comments: Educated pt on incentive spirometer use, RW safety/management; safe car transfers; NAHED hose wear schedule and care; importance of being OOB and ambulating a minimum of 1x/hour  Education Method: Demonstration;Verbal  Barriers to Learning: None  Education Outcome: Verbalized understanding;Demonstrated understanding        AM-PAC Score        -[facility] Inpatient Daily Activity Raw Score: 19 (07/27/22 1453)  AM-PAC Inpatient ADL T-Scale Score : 40.22 (07/27/22 1453)  ADL Inpatient CMS 0-100% Score: 42.8 (07/27/22 1453)  ADL Inpatient CMS G-Code Modifier : CK (07/27/22 1453)         Goals  Short Term Goals  Time Frame for Short term goals: By discharge, pt will  Short Term Goal 1: Demo and verb good understanding of fall prevention, EC/WS techs, RW safety/management, safe car transfers with RW, NAHED hose wear/care, importance of getting up minimum of 1x/hour, and incentive spirometer use.   Short Term Goal 2: Demo supervision with ADL transfers and functional mob for household distances with approp AD and good safety. Short Term Goal 3: Additional goals to be added to POC should pt stay for unknown reason. Patient Goals   Patient goals : To go home       Therapy Time   Individual Concurrent Group Co-treatment   Time In 0410         Time Out 1440         Minutes 62          Total treatment minutes: 52   Co-treatment with PT warranted first time up day of surgery. Cotx due to potential risk of decreased sensation, muscle control and proprioception from spinal epidural and/or regional block. Decreased safety and independence requiring 2 skilled therapy professionals to address individual discipline's goals.       Katelynn Rivera, OT Xenograft Text: The defect edges were debeveled with a #15 scalpel blade. Given the location of the defect, shape of the defect and the proximity to free margins a xenograft was deemed most appropriate.  The graft was then trimmed to fit the size of the defect.  The graft was then placed in the primary defect and oriented appropriately.

## (undated) DEVICE — PREP-RESISTANT MARKER W/ RULER: Brand: MEDLINE INDUSTRIES, INC.

## (undated) DEVICE — SUTURE ABSRB BRAID COAT UD CP NO 2 27IN VCRL J195H

## (undated) DEVICE — PAD,NON-ADHERENT,3X8,STERILE,LF,1/PK: Brand: MEDLINE

## (undated) DEVICE — ZIPPERED TOGA, 2X LARGE: Brand: FLYTE, SURGICOOL

## (undated) DEVICE — SOLUTION IV IRRIG POUR BRL 0.9% SODIUM CHL 2F7124

## (undated) DEVICE — Device

## (undated) DEVICE — SOLUTION IV 1000ML 0.9% SOD CHL PH 5 INJ USP VIAFLX PLAS

## (undated) DEVICE — SYRINGE MED 30ML STD CLR PLAS LUERLOCK TIP N CTRL DISP

## (undated) DEVICE — 60 ML SYRINGE REGULAR TIP: Brand: MONOJECT

## (undated) DEVICE — ADHESIVE SKIN CLOSURE TOP 36 CC HI VISC DERMBND MINI

## (undated) DEVICE — DRESSING TRNSPAR W4XL10IN FLM MIC POR SURESITE 123

## (undated) DEVICE — Device: Brand: DISPOSABLE INJECTOR NM

## (undated) DEVICE — SET CVR FT AND 8IN UPR

## (undated) DEVICE — BASIN EMSIS 700ML GRAPHITE PLAS KID SHP GRAD

## (undated) DEVICE — GLOVE ORTHO 8   MSG9480

## (undated) DEVICE — TUBING, SUCTION, 1/4" X 12', STRAIGHT: Brand: MEDLINE

## (undated) DEVICE — FORCEPS BX L240CM WRK CHN 2.8MM STD CAP W/ NDL MIC MESH

## (undated) DEVICE — SUTURE MCRYL SZ 3-0 L27IN ABSRB UD L19MM PS-2 3/8 CIR PRIM Y427H

## (undated) DEVICE — SOLUTION IV IRRIG 500ML 0.9% SODIUM CHL 2F7123

## (undated) DEVICE — SUTURE VCRL SZ 2-0 L36IN ABSRB UD L36MM CT-1 1/2 CIR J945H

## (undated) DEVICE — 2T11 #2 PDO 36 X 36: Brand: 2T11 #2 PDO 36 X 36

## (undated) DEVICE — BLADE SAGITAL 18X90X1.27MM

## (undated) DEVICE — BLANKET WRM W29.9XL79.1IN UP BODY FORC AIR MISTRAL-AIR

## (undated) DEVICE — SUTURE STRATAFIX SPRL MCRYL + SZ 2-0 L18IN ABSRB UD CT-1 SXMP1B413

## (undated) DEVICE — NEEDLE, QUINCKE, 18GX3.5": Brand: MEDLINE